# Patient Record
Sex: FEMALE | Employment: UNEMPLOYED | URBAN - METROPOLITAN AREA
[De-identification: names, ages, dates, MRNs, and addresses within clinical notes are randomized per-mention and may not be internally consistent; named-entity substitution may affect disease eponyms.]

---

## 2017-04-10 ENCOUNTER — TRANSFERRED RECORDS (OUTPATIENT)
Dept: HEALTH INFORMATION MANAGEMENT | Facility: CLINIC | Age: 37
End: 2017-04-10

## 2017-10-19 ENCOUNTER — TRANSFERRED RECORDS (OUTPATIENT)
Dept: HEALTH INFORMATION MANAGEMENT | Facility: CLINIC | Age: 37
End: 2017-10-19

## 2017-11-06 ENCOUNTER — TRANSFERRED RECORDS (OUTPATIENT)
Dept: HEALTH INFORMATION MANAGEMENT | Facility: CLINIC | Age: 37
End: 2017-11-06

## 2017-12-04 ASSESSMENT — ENCOUNTER SYMPTOMS
JAUNDICE: 0
NAUSEA: 0
HALLUCINATIONS: 0
ALTERED TEMPERATURE REGULATION: 1
INCREASED ENERGY: 0
PANIC: 0
INSOMNIA: 0
BLOOD IN STOOL: 1
HEARTBURN: 0
POLYPHAGIA: 0
POLYDIPSIA: 0
CONSTIPATION: 1
NERVOUS/ANXIOUS: 1
CHILLS: 0
DEPRESSION: 1
FATIGUE: 1
ABDOMINAL PAIN: 0
NIGHT SWEATS: 1
RECTAL PAIN: 0
BOWEL INCONTINENCE: 0
FEVER: 0
DIARRHEA: 0
DECREASED APPETITE: 0
BLOATING: 1
VOMITING: 0
HOT FLASHES: 1
WEIGHT LOSS: 0
DECREASED LIBIDO: 1
WEIGHT GAIN: 0
DECREASED CONCENTRATION: 1

## 2017-12-04 ASSESSMENT — ANXIETY QUESTIONNAIRES
4. TROUBLE RELAXING: SEVERAL DAYS
3. WORRYING TOO MUCH ABOUT DIFFERENT THINGS: MORE THAN HALF THE DAYS
GAD7 TOTAL SCORE: 12
7. FEELING AFRAID AS IF SOMETHING AWFUL MIGHT HAPPEN: MORE THAN HALF THE DAYS
6. BECOMING EASILY ANNOYED OR IRRITABLE: MORE THAN HALF THE DAYS
5. BEING SO RESTLESS THAT IT IS HARD TO SIT STILL: SEVERAL DAYS
7. FEELING AFRAID AS IF SOMETHING AWFUL MIGHT HAPPEN: MORE THAN HALF THE DAYS
GAD7 TOTAL SCORE: 12
2. NOT BEING ABLE TO STOP OR CONTROL WORRYING: MORE THAN HALF THE DAYS
GAD7 TOTAL SCORE: 12
1. FEELING NERVOUS, ANXIOUS, OR ON EDGE: MORE THAN HALF THE DAYS

## 2017-12-04 ASSESSMENT — PATIENT HEALTH QUESTIONNAIRE - PHQ9
SUM OF ALL RESPONSES TO PHQ QUESTIONS 1-9: 12
SUM OF ALL RESPONSES TO PHQ QUESTIONS 1-9: 12
10. IF YOU CHECKED OFF ANY PROBLEMS, HOW DIFFICULT HAVE THESE PROBLEMS MADE IT FOR YOU TO DO YOUR WORK, TAKE CARE OF THINGS AT HOME, OR GET ALONG WITH OTHER PEOPLE: SOMEWHAT DIFFICULT

## 2017-12-05 ENCOUNTER — OFFICE VISIT (OUTPATIENT)
Dept: OBGYN | Facility: CLINIC | Age: 37
End: 2017-12-05
Attending: OBSTETRICS & GYNECOLOGY
Payer: COMMERCIAL

## 2017-12-05 VITALS — SYSTOLIC BLOOD PRESSURE: 117 MMHG | DIASTOLIC BLOOD PRESSURE: 77 MMHG | WEIGHT: 158.6 LBS | HEART RATE: 56 BPM

## 2017-12-05 DIAGNOSIS — Z71.89 COUNSELING FOR HORMONE REPLACEMENT THERAPY: Primary | ICD-10-CM

## 2017-12-05 PROBLEM — C53.9 CERVICAL CANCER (H): Status: ACTIVE | Noted: 2017-12-05

## 2017-12-05 PROBLEM — F32.A DEPRESSION: Status: ACTIVE | Noted: 2017-12-05

## 2017-12-05 RX ORDER — ESCITALOPRAM OXALATE 10 MG/1
20 TABLET ORAL DAILY
COMMUNITY
Start: 2015-09-14

## 2017-12-05 RX ORDER — ESTRADIOL 0.05 MG/D
1 PATCH, EXTENDED RELEASE TRANSDERMAL
Qty: 24 PATCH | Refills: 0 | Status: SHIPPED | OUTPATIENT
Start: 2017-12-07 | End: 2018-02-27

## 2017-12-05 ASSESSMENT — ANXIETY QUESTIONNAIRES: GAD7 TOTAL SCORE: 12

## 2017-12-05 ASSESSMENT — PAIN SCALES - GENERAL: PAINLEVEL: NO PAIN (0)

## 2017-12-05 NOTE — PROGRESS NOTES
[facility]    HPI: Linette Rosa is a 37 year old  with a recent history of cervical cancer s/p chemoradiation who presents to discus hormone replacement therapy. 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 She has received her treatment with Dr. Weldon at [facility]. Since undergoing radiation, she has not had any menstrual bleeding; previously menses were regular. Additionally, she reports bothersome hot flashes and notes significant discomfort with intercourse and worsening of mood swings and depression symptoms. She was prescribed HRT by Dr. Weldon, however she has not started taking it yet as she is more interested in bioidentical options. She reports that she has heard from other people that bioidentical HRT works better. Aside from the cervical cancer, her only other past medical history is depression for which she is on Lexapro. She denies history of blood clots.     OB history:  x1    Past Medical History:   Diagnosis Date     Abnormal Pap smear 3/14/17    Diagnosed with Cervical Cancer on April 3, 2017     Herpes simplex without mention of complication 2007     Postpartum depression 2017     Past Surgical History:   Procedure Laterality Date     BIOPSY  3/30/2017    As a result of abnormal pap     COLONOSCOPY  04/10/2017    As a result of Pet SCAN post cancer diagnosis, normal result       Current Outpatient Prescriptions:      escitalopram (LEXAPRO) 10 MG tablet, 10 mg daily, Disp: , Rfl:      progesterone (PROMETRIUM) 200 MG capsule, Take 1 capsule (200 mg) by mouth daily, Disp: 90 capsule, Rfl: 3     [START ON 2017] estradiol (VIVELLE-DOT) 0.05 MG/24HR BIW patch, Place 1 patch onto the skin twice a week, Disp: 24 patch, Rfl: 0     cholecalciferol (VITAMIN  D-1000 MAX ST) 1000 UNITS TABS, Take 1,000 Units by mouth, Disp: , Rfl:      MAGNESIUM PO, Take 200 mg by mouth, Disp: , Rfl:        Allergies   Allergen Reactions     Carica Papaya Nausea and Vomiting     Levaquin Itching     Levofloxacin Itching     Salbador Flavor Nausea and Vomiting       Answers for HPI/ROS submitted by the patient on 12/4/2017   COLIN 7 TOTAL SCORE: 12  PHQ-2 Score: 4  If you checked off any problems, how difficult have these problems made it for you to do your work, take care of things at home, or get along with other people?: Somewhat difficult  PHQ9 TOTAL SCORE: 12  General Symptoms: Yes  Skin Symptoms: No  HENT Symptoms: No  EYE SYMPTOMS: No  HEART SYMPTOMS: No  LUNG SYMPTOMS: No  INTESTINAL SYMPTOMS: Yes  URINARY SYMPTOMS: No  GYNECOLOGIC SYMPTOMS: Yes  BREAST SYMPTOMS: No  SKELETAL SYMPTOMS: No  BLOOD SYMPTOMS: No  NERVOUS SYSTEM SYMPTOMS: No  MENTAL HEALTH SYMPTOMS: Yes  Fever: No  Loss of appetite: No  Weight loss: No  Weight gain: No  Fatigue: Yes  Night sweats: Yes  Chills: No  Increased stress: Yes  Excessive hunger: No  Excessive thirst: No  Feeling hot or cold when others believe the temperature is normal: Yes  Loss of height: No  Post-operative complications: No  Surgical site pain: Yes  Hallucinations: No  Change in or Loss of Energy: No  Hyperactivity: No  Confusion: No  Heart burn or indigestion: No  Nausea: No  Vomiting: No  Abdominal pain: No  Bloating: Yes  Constipation: Yes  Diarrhea: No  Blood in stool: Yes  Black stools: No  Rectal or Anal pain: No  Fecal incontinence: No  Yellowing of skin or eyes: No  Vomit with blood: No  Change in stools: No  Bleeding or spotting between periods: No  Heavy or painful periods: No  Irregular periods: No  Vaginal discharge: No  Hot flashes: Yes  Vaginal dryness: Yes  Reduced libido: Yes  Painful intercourse: Yes  Difficulty with sexual arousal: Yes  Post-menopausal bleeding: No  Nervous or Anxious: Yes  Depression: Yes  Trouble sleeping:  No  Trouble thinking or concentrating: Yes  Mood changes: Yes  Panic attacks: No    Exam:  Vitals:    12/05/17 1521   BP: 117/77   BP Location: Right arm   Patient Position: Chair   Pulse: 56   Weight: 71.9 kg (158 lb 9.6 oz)     Gen: alert, oriented, NAD  Not otherwise examined    A/P:  37 year old  with symptoms of 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 We discussed that often this term refers to compounded therapies, which are not FDA approved and which I do not prescribe. We reviewed the methods for administration of estradiol therapy including oral and transdermal routes. We discussed risks with HRT of blood clots, stroke and breast cancer- although this was among studies of postmenopausal women, whereas in Linette's case we are replacing to match normal physiology. Also, we reviewed the risks associated with NOT using HRT, which include increased risk of osteoporosis and cardiovascular disease.     We also discussed an alternate option to prometrium of a levonorgestrel IUD, with benefits of minimal systemic hormone effect and not needing to take a daily medication. We discussed that while levonorgestrel is not bioidentical, there is very minimal systemic circulation. Given that Linette is not actively undergoing treatment for cervical cancer, she would be a candidate for an IUD.     At the end of our discussion, she elected to start Vivelle dot and oral prometrium. She will return to clinic if she desires IUD placement. We discussed that dose of Vivelle can be titrated up to 0.1 mg/day if she tolerates well, which would be most physiologic for her age. We discussed that it may not resolve mood symptoms given this may be more  multifactorial. Plan follow up in clinic in 6-8 weeks, or if she is tolerating everything well we can communicate by phone in an effort to minimize visits for her.     A total of 30 minutes was spent face to face with Linette Rosa today. Over 50% of this time was spent counseling the patient and/or coordinating care regarding hormone replacement therapy.     Viky Rosa MD

## 2017-12-05 NOTE — LETTER
2017       RE: Linette Rosa  [address]  [place] [address]     Dear Colleague,    Thank you for referring your patient, Linette Rosa, to the [facility] at [facility]. Please see a copy of my visit note below.    [facility]    HPI: Linette Rosa is a 37 year old  with a recent history of cervical cancer s/p chemoradiation who presents to discus hormone replacement therapy. 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 She has received her treatment with Dr. Weldon at [facility]. Since undergoing radiation, she has not had any menstrual bleeding; previously menses were regular. Additionally, she reports bothersome hot flashes and notes significant discomfort with intercourse and worsening of mood swings and depression symptoms. She was prescribed HRT by Dr. Weldon, however she has not started taking it yet as she is more interested in bioidentical options. She reports that she has heard from other people that bioidentical HRT works better. Aside from the cervical cancer, her only other past medical history is depression for which she is on Lexapro. She denies history of blood clots.     OB history:  x1    Past Medical History:   Diagnosis Date     Abnormal Pap smear 3/14/17    Diagnosed with Cervical Cancer on April 3, 2017     Herpes simplex without mention of complication 2007     Postpartum depression 2017     Past Surgical History:   Procedure Laterality Date     BIOPSY  3/30/2017    As a result of abnormal pap     COLONOSCOPY  04/10/2017    As a result of Pet SCAN post cancer diagnosis, normal result       Current Outpatient Prescriptions:      escitalopram (LEXAPRO) 10 MG tablet, 10 mg daily, Disp:  , Rfl:      progesterone (PROMETRIUM) 200 MG capsule, Take 1 capsule (200 mg) by mouth daily, Disp: 90 capsule, Rfl: 3     [START ON 12/7/2017] estradiol (VIVELLE-DOT) 0.05 MG/24HR BIW patch, Place 1 patch onto the skin twice a week, Disp: 24 patch, Rfl: 0     cholecalciferol (VITAMIN D-1000 MAX ST) 1000 UNITS TABS, Take 1,000 Units by mouth, Disp: , Rfl:      MAGNESIUM PO, Take 200 mg by mouth, Disp: , Rfl:        Allergies   Allergen Reactions     Carica Papaya Nausea and Vomiting     Levaquin Itching     Levofloxacin Itching     Salbador Flavor Nausea and Vomiting       Answers for HPI/ROS submitted by the patient on 12/4/2017   COLIN 7 TOTAL SCORE: 12  PHQ-2 Score: 4  If you checked off any problems, how difficult have these problems made it for you to do your work, take care of things at home, or get along with other people?: Somewhat difficult  PHQ9 TOTAL SCORE: 12  General Symptoms: Yes  Skin Symptoms: No  HENT Symptoms: No  EYE SYMPTOMS: No  HEART SYMPTOMS: No  LUNG SYMPTOMS: No  INTESTINAL SYMPTOMS: Yes  URINARY SYMPTOMS: No  GYNECOLOGIC SYMPTOMS: Yes  BREAST SYMPTOMS: No  SKELETAL SYMPTOMS: No  BLOOD SYMPTOMS: No  NERVOUS SYSTEM SYMPTOMS: No  MENTAL HEALTH SYMPTOMS: Yes  Fever: No  Loss of appetite: No  Weight loss: No  Weight gain: No  Fatigue: Yes  Night sweats: Yes  Chills: No  Increased stress: Yes  Excessive hunger: No  Excessive thirst: No  Feeling hot or cold when others believe the temperature is normal: Yes  Loss of height: No  Post-operative complications: No  Surgical site pain: Yes  Hallucinations: No  Change in or Loss of Energy: No  Hyperactivity: No  Confusion: No  Heart burn or indigestion: No  Nausea: No  Vomiting: No  Abdominal pain: No  Bloating: Yes  Constipation: Yes  Diarrhea: No  Blood in stool: Yes  Black stools: No  Rectal or Anal pain: No  Fecal incontinence: No  Yellowing of skin or eyes: No  Vomit with blood: No  Change in stools: No  Bleeding or spotting between periods: No  Heavy  or painful periods: No  Irregular periods: No  Vaginal discharge: No  Hot flashes: Yes  Vaginal dryness: Yes  Reduced libido: Yes  Painful intercourse: Yes  Difficulty with sexual arousal: Yes  Post-menopausal bleeding: No  Nervous or Anxious: Yes  Depression: Yes  Trouble sleeping: No  Trouble thinking or concentrating: Yes  Mood changes: Yes  Panic attacks: No    Exam:  Vitals:    12/05/17 1521   BP: 117/77   BP Location: Right arm   Patient Position: Chair   Pulse: 56   Weight: 71.9 kg (158 lb 9.6 oz)     Gen: alert, oriented, NAD  Not otherwise examined    A/P:  37 year old  with symptoms of 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 We discussed that often this term refers to compounded therapies, which are not FDA approved and which I do not prescribe. We reviewed the methods for administration of estradiol therapy including oral and transdermal routes. We discussed risks with HRT of blood clots, stroke and breast cancer- although this was among studies of postmenopausal women, whereas in Linette's case we are replacing to match normal physiology. Also, we reviewed the risks associated with NOT using HRT, which include increased risk of osteoporosis and cardiovascular disease.     We also discussed an alternate option to prometrium of a levonorgestrel IUD, with benefits of minimal systemic hormone effect and not needing to take a daily medication. We discussed that while levonorgestrel is not bioidentical, there is very minimal systemic circulation. Given that Linette is not actively undergoing treatment for cervical cancer, she would be a candidate for an IUD.     At the end of our discussion, she elected to start Tara hyatt and  oral prometrium. She will return to clinic if she desires IUD placement. We discussed that dose of Vivelle can be titrated up to 0.1 mg/day if she tolerates well, which would be most physiologic for her age. We discussed that it may not resolve mood symptoms given this may be more multifactorial. Plan follow up in clinic in 6-8 weeks, or if she is tolerating everything well we can communicate by phone in an effort to minimize visits for her.     A total of 30 minutes was spent face to face with Linetteluna Rosa today. Over 50% of this time was spent counseling the patient and/or coordinating care regarding hormone replacement therapy.     Viky Rosa MD      Again, thank you for allowing me to participate in the care of your patient.      Sincerely,    Viky Rosa MD

## 2017-12-05 NOTE — LETTER
Date:December 6, 2017      Patient was self referred, no letter generated. Do not send.        [facility] Health Information

## 2017-12-05 NOTE — MR AVS SNAPSHOT
After Visit Summary   12/5/2017    Linette Rosa    MRN: [id number]           Patient Information     Date Of Birth          1980        Visit Information        Provider Department      12/5/2017 3:30 PM Viky Rosa MD [facility]        Today's Diagnoses     Counseling for hormone replacement therapy    -  1       Follow-ups after your visit        Follow-up notes from your care team     Return in about 6 weeks (around 1/16/2018).      Who to contact     Please call your clinic at [phone] to:    Ask questions about your health    Make or cancel appointments    Discuss your medicines    Learn about your test results    Speak to your doctor   If you have compliments or concerns about an experience at your clinic, or if you wish to file a complaint, please contact [facility] Patient Relations at [phone] or email us at [email or web address]         Additional Information About Your Visit        MyChart Information     [employer] gives you secure access to your electronic health record. If you see a primary care provider, you can also send messages to your care team and make appointments. If you have questions, please call your primary care clinic.  If you do not have a primary care provider, please call [phone] and they will assist you.      test company is an electronic gateway that provides easy, online access to your medical records. With test company, you can request a clinic appointment, read your test results, renew a prescription or communicate with your care team.     To access your existing account, please contact your [facility] or call [phone] for assistance.        Care EveryWhere ID     This is your Care EveryWhere ID. This could be used by other organizations to access your [facility] medical records  [id number]        Your Vitals Were     Pulse Breastfeeding?                56  No           Blood Pressure from Last 3 Encounters:   12/05/17 117/77    Weight from Last 3 Encounters:   12/05/17 71.9 kg (158 lb 9.6 oz)              Today, you had the following     No orders found for display         Today's Medication Changes          These changes are accurate as of: 12/5/17  5:15 PM.  If you have any questions, ask your nurse or doctor.               Start taking these medicines.        Dose/Directions    estradiol 0.05 MG/24HR BIW patch   Commonly known as:  VIVELLE-DOT   Used for:  Counseling for hormone replacement therapy   Started by:  Viky Rosa MD        Dose:  1 patch   Start taking on:  12/7/2017   Place 1 patch onto the skin twice a week   Quantity:  24 patch   Refills:  0       progesterone 200 MG capsule   Commonly known as:  PROMETRIUM   Used for:  Counseling for hormone replacement therapy   Started by:  Viky Rosa MD        Dose:  200 mg   Take 1 capsule (200 mg) by mouth daily   Quantity:  90 capsule   Refills:  3            Where to get your medicines      These medications were sent to [id number] - [place], [place] - [address] AT [facility] [address]  [address], [place] [address]    Hours:  24-hours Phone:  [phone]     estradiol 0.05 MG/24HR BIW patch    progesterone 200 MG capsule                Primary Care Provider    None Specified       No primary provider on file.        Equal Access to Services     [facility] AH: Hadii flakita garcia hadasho Sodennys, waaxda luqadaha, qaybta kaalmada adeegyada, torrie lindsey . So [facility] [phone].    ATENCIÓN: Si habla español, tiene a lemon disposición servicios gratuitos de asistencia lingüística. Llame al [phone].    We comply with applicable federal civil rights laws and [place] laws. We do not discriminate on the basis of race, color, national origin, age, disability, sex, sexual orientation, or gender identity.            Thank you!      Thank you for choosing [facility]  for your care. Our goal is always to provide you with excellent care. Hearing back from our patients is one way we can continue to improve our services. Please take a few minutes to complete the written survey that you may receive in the mail after your visit with us. Thank you!             Your Updated Medication List - Protect others around you: Learn how to safely use, store and throw away your medicines at www.disposemymeds.org.          This list is accurate as of: 12/5/17  5:15 PM.  Always use your most recent med list.                   Brand Name Dispense Instructions for use Diagnosis    escitalopram 10 MG tablet    LEXAPRO     10 mg daily        estradiol 0.05 MG/24HR BIW patch   Start taking on:  12/7/2017    VIVELLE-DOT    24 patch    Place 1 patch onto the skin twice a week    Counseling for hormone replacement therapy       MAGNESIUM PO      Take 200 mg by mouth        progesterone 200 MG capsule    PROMETRIUM    90 capsule    Take 1 capsule (200 mg) by mouth daily    Counseling for hormone replacement therapy       VITAMIN D-1000 MAX ST 1000 UNITS Tabs   Generic drug:  cholecalciferol      Take 1,000 Units by mouth

## 2017-12-05 NOTE — LETTER
2017       RE: Linette Rosa  [address]  [place] [address]     Dear Colleague,    Thank you for referring your patient, Linette Rosa, to the [facility] at [facility]. Please see a copy of my visit note below.    [facility]    HPI: Linette Rosa is a 37 year old  with a recent history of cervical cancer s/p chemoradiation who presents to discus hormone replacement therapy. 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 She has received her treatment with Dr. Weldon at [facility]. Since undergoing radiation, she has not had any menstrual bleeding; previously menses were regular. Additionally, she reports bothersome hot flashes and notes significant discomfort with intercourse and worsening of mood swings and depression symptoms. She was prescribed HRT by Dr. Weldon, however she has not started taking it yet as she is more interested in bioidentical options. She reports that she has heard from other people that bioidentical HRT works better. Aside from the cervical cancer, her only other past medical history is depression for which she is on Lexapro. She denies history of blood clots.     OB history:  x1    Past Medical History:   Diagnosis Date     Abnormal Pap smear 3/14/17    Diagnosed with Cervical Cancer on April 3, 2017     Herpes simplex without mention of complication 2007     Postpartum depression 2017     Past Surgical History:   Procedure Laterality Date     BIOPSY  3/30/2017    As a result of abnormal pap     COLONOSCOPY  04/10/2017    As a result of Pet SCAN post cancer diagnosis, normal result       Current Outpatient Prescriptions:      escitalopram (LEXAPRO) 10 MG tablet, 10 mg daily, Disp:  , Rfl:      progesterone (PROMETRIUM) 200 MG capsule, Take 1 capsule (200 mg) by mouth daily, Disp: 90 capsule, Rfl: 3     [START ON 12/7/2017] estradiol (VIVELLE-DOT) 0.05 MG/24HR BIW patch, Place 1 patch onto the skin twice a week, Disp: 24 patch, Rfl: 0     cholecalciferol (VITAMIN D-1000 MAX ST) 1000 UNITS TABS, Take 1,000 Units by mouth, Disp: , Rfl:      MAGNESIUM PO, Take 200 mg by mouth, Disp: , Rfl:        Allergies   Allergen Reactions     Carica Papaya Nausea and Vomiting     Levaquin Itching     Levofloxacin Itching     Salbador Flavor Nausea and Vomiting       Answers for HPI/ROS submitted by the patient on 12/4/2017   COLIN 7 TOTAL SCORE: 12  PHQ-2 Score: 4  If you checked off any problems, how difficult have these problems made it for you to do your work, take care of things at home, or get along with other people?: Somewhat difficult  PHQ9 TOTAL SCORE: 12  General Symptoms: Yes  Skin Symptoms: No  HENT Symptoms: No  EYE SYMPTOMS: No  HEART SYMPTOMS: No  LUNG SYMPTOMS: No  INTESTINAL SYMPTOMS: Yes  URINARY SYMPTOMS: No  GYNECOLOGIC SYMPTOMS: Yes  BREAST SYMPTOMS: No  SKELETAL SYMPTOMS: No  BLOOD SYMPTOMS: No  NERVOUS SYSTEM SYMPTOMS: No  MENTAL HEALTH SYMPTOMS: Yes  Fever: No  Loss of appetite: No  Weight loss: No  Weight gain: No  Fatigue: Yes  Night sweats: Yes  Chills: No  Increased stress: Yes  Excessive hunger: No  Excessive thirst: No  Feeling hot or cold when others believe the temperature is normal: Yes  Loss of height: No  Post-operative complications: No  Surgical site pain: Yes  Hallucinations: No  Change in or Loss of Energy: No  Hyperactivity: No  Confusion: No  Heart burn or indigestion: No  Nausea: No  Vomiting: No  Abdominal pain: No  Bloating: Yes  Constipation: Yes  Diarrhea: No  Blood in stool: Yes  Black stools: No  Rectal or Anal pain: No  Fecal incontinence: No  Yellowing of skin or eyes: No  Vomit with blood: No  Change in stools: No  Bleeding or spotting between periods: No  Heavy  or painful periods: No  Irregular periods: No  Vaginal discharge: No  Hot flashes: Yes  Vaginal dryness: Yes  Reduced libido: Yes  Painful intercourse: Yes  Difficulty with sexual arousal: Yes  Post-menopausal bleeding: No  Nervous or Anxious: Yes  Depression: Yes  Trouble sleeping: No  Trouble thinking or concentrating: Yes  Mood changes: Yes  Panic attacks: No    Exam:  Vitals:    12/05/17 1521   BP: 117/77   BP Location: Right arm   Patient Position: Chair   Pulse: 56   Weight: 71.9 kg (158 lb 9.6 oz)     Gen: alert, oriented, NAD  Not otherwise examined    A/P:  37 year old  with symptoms of 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 We discussed that often this term refers to compounded therapies, which are not FDA approved and which I do not prescribe. We reviewed the methods for administration of estradiol therapy including oral and transdermal routes. We discussed risks with HRT of blood clots, stroke and breast cancer- although this was among studies of postmenopausal women, whereas in Linette's case we are replacing to match normal physiology. Also, we reviewed the risks associated with NOT using HRT, which include increased risk of osteoporosis and cardiovascular disease.     We also discussed an alternate option to prometrium of a levonorgestrel IUD, with benefits of minimal systemic hormone effect and not needing to take a daily medication. We discussed that while levonorgestrel is not bioidentical, there is very minimal systemic circulation. Given that Linette is not actively undergoing treatment for cervical cancer, she would be a candidate for an IUD.     At the end of our discussion, she elected to start Tara hyatt and  oral prometrium. She will return to clinic if she desires IUD placement. We discussed that dose of Vivelle can be titrated up to 0.1 mg/day if she tolerates well, which would be most physiologic for her age. We discussed that it may not resolve mood symptoms given this may be more multifactorial. Plan follow up in clinic in 6-8 weeks, or if she is tolerating everything well we can communicate by phone in an effort to minimize visits for her.     A total of 30 minutes was spent face to face with Linetteluna Rosa today. Over 50% of this time was spent counseling the patient and/or coordinating care regarding hormone replacement therapy.     Viky Rosa MD      Again, thank you for allowing me to participate in the care of your patient.      Sincerely,    Viky Rosa MD

## 2018-01-28 ENCOUNTER — HEALTH MAINTENANCE LETTER (OUTPATIENT)
Age: 38
End: 2018-01-28

## 2018-01-29 ENCOUNTER — TRANSFERRED RECORDS (OUTPATIENT)
Dept: HEALTH INFORMATION MANAGEMENT | Facility: CLINIC | Age: 38
End: 2018-01-29

## 2018-02-09 ENCOUNTER — TRANSFERRED RECORDS (OUTPATIENT)
Dept: HEALTH INFORMATION MANAGEMENT | Facility: CLINIC | Age: 38
End: 2018-02-09

## 2018-02-27 DIAGNOSIS — Z71.89 COUNSELING FOR HORMONE REPLACEMENT THERAPY: ICD-10-CM

## 2018-02-27 RX ORDER — ESTRADIOL 0.05 MG/D
1 PATCH, EXTENDED RELEASE TRANSDERMAL
Qty: 24 PATCH | Refills: 2 | Status: SHIPPED | OUTPATIENT
Start: 2018-03-01 | End: 2018-04-04

## 2018-02-27 NOTE — TELEPHONE ENCOUNTER
Received refill request for Tara hyatt.  Last in clinic 12/2017 at which time HRT medications were started.. Dr Rosa did send [employer] message 2/2018 to see how Linette was tolerating medications and if needed a change in dosage but Linette didn't respond.     I attempted to reach Linette,left VM I would send refill to Dr Rosa for approval and if she needed to discuss any dosage changes to either [employer] [facility] or call nurse triage.

## 2018-03-02 ENCOUNTER — TRANSFERRED RECORDS (OUTPATIENT)
Dept: HEALTH INFORMATION MANAGEMENT | Facility: CLINIC | Age: 38
End: 2018-03-02

## 2018-03-28 ENCOUNTER — TRANSFERRED RECORDS (OUTPATIENT)
Dept: HEALTH INFORMATION MANAGEMENT | Facility: CLINIC | Age: 38
End: 2018-03-28

## 2018-04-02 ENCOUNTER — TRANSFERRED RECORDS (OUTPATIENT)
Dept: HEALTH INFORMATION MANAGEMENT | Facility: CLINIC | Age: 38
End: 2018-04-02

## 2018-04-04 DIAGNOSIS — Z71.89 COUNSELING FOR HORMONE REPLACEMENT THERAPY: ICD-10-CM

## 2018-04-04 RX ORDER — ESTRADIOL 0.05 MG/D
1 PATCH, EXTENDED RELEASE TRANSDERMAL
Qty: 24 PATCH | Refills: 2 | Status: SHIPPED | OUTPATIENT
Start: 2018-04-05

## 2018-04-04 NOTE — TELEPHONE ENCOUNTER
Received request for estradiol patch and progesterone to be sent to [facility].    Remaining prescriptions sent.

## 2018-04-09 ENCOUNTER — TELEPHONE (OUTPATIENT)
Dept: OBGYN | Facility: CLINIC | Age: 38
End: 2018-04-09

## 2018-04-09 NOTE — TELEPHONE ENCOUNTER
[facility]ed [facility] to [facility] -- seeking clarification of order. Provided directions per Dr. Rosa's order on 4/4

## 2018-04-09 NOTE — TELEPHONE ENCOUNTER
----- Message from Martina Frazier sent at 4/6/2018  2:09 PM CDT -----  Regarding: [facility]/Estrodial Patch  [facility] calling about estrodial patch.  Reference [id number]  Call:  [phone]

## 2018-04-25 ENCOUNTER — TRANSFERRED RECORDS (OUTPATIENT)
Dept: HEALTH INFORMATION MANAGEMENT | Facility: CLINIC | Age: 38
End: 2018-04-25

## 2018-08-06 ENCOUNTER — TRANSFERRED RECORDS (OUTPATIENT)
Dept: HEALTH INFORMATION MANAGEMENT | Facility: CLINIC | Age: 38
End: 2018-08-06

## 2018-08-10 ENCOUNTER — TRANSFERRED RECORDS (OUTPATIENT)
Dept: HEALTH INFORMATION MANAGEMENT | Facility: CLINIC | Age: 38
End: 2018-08-10

## 2018-08-16 ENCOUNTER — TRANSFERRED RECORDS (OUTPATIENT)
Dept: HEALTH INFORMATION MANAGEMENT | Facility: CLINIC | Age: 38
End: 2018-08-16

## 2018-09-18 ENCOUNTER — TRANSFERRED RECORDS (OUTPATIENT)
Dept: HEALTH INFORMATION MANAGEMENT | Facility: CLINIC | Age: 38
End: 2018-09-18

## 2018-10-16 ENCOUNTER — TRANSFERRED RECORDS (OUTPATIENT)
Dept: HEALTH INFORMATION MANAGEMENT | Facility: CLINIC | Age: 38
End: 2018-10-16

## 2018-10-31 ENCOUNTER — TRANSFERRED RECORDS (OUTPATIENT)
Dept: HEALTH INFORMATION MANAGEMENT | Facility: CLINIC | Age: 38
End: 2018-10-31

## 2018-11-28 ENCOUNTER — TRANSFERRED RECORDS (OUTPATIENT)
Dept: HEALTH INFORMATION MANAGEMENT | Facility: CLINIC | Age: 38
End: 2018-11-28

## 2018-12-07 ENCOUNTER — TRANSFERRED RECORDS (OUTPATIENT)
Dept: HEALTH INFORMATION MANAGEMENT | Facility: CLINIC | Age: 38
End: 2018-12-07

## 2019-01-01 ENCOUNTER — DOCUMENTATION ONLY (OUTPATIENT)
Dept: GASTROENTEROLOGY | Facility: CLINIC | Age: 39
End: 2019-01-01

## 2019-01-01 ENCOUNTER — HOME INFUSION (PRE-WILLOW HOME INFUSION) (OUTPATIENT)
Dept: PHARMACY | Facility: CLINIC | Age: 39
End: 2019-01-01

## 2019-01-01 ENCOUNTER — OFFICE VISIT (OUTPATIENT)
Dept: GASTROENTEROLOGY | Facility: CLINIC | Age: 39
End: 2019-01-01
Payer: COMMERCIAL

## 2019-01-01 ENCOUNTER — TELEPHONE (OUTPATIENT)
Dept: GASTROENTEROLOGY | Facility: CLINIC | Age: 39
End: 2019-01-01

## 2019-01-01 ENCOUNTER — PRE VISIT (OUTPATIENT)
Dept: GASTROENTEROLOGY | Facility: CLINIC | Age: 39
End: 2019-01-01

## 2019-01-01 ENCOUNTER — PATIENT OUTREACH (OUTPATIENT)
Dept: GASTROENTEROLOGY | Facility: CLINIC | Age: 39
End: 2019-01-01

## 2019-01-01 ENCOUNTER — DOCUMENTATION ONLY (OUTPATIENT)
Dept: CARE COORDINATION | Facility: CLINIC | Age: 39
End: 2019-01-01

## 2019-01-01 ENCOUNTER — TRANSFERRED RECORDS (OUTPATIENT)
Dept: HEALTH INFORMATION MANAGEMENT | Facility: CLINIC | Age: 39
End: 2019-01-01

## 2019-01-01 VITALS
HEIGHT: 66 IN | OXYGEN SATURATION: 100 % | WEIGHT: 131.9 LBS | SYSTOLIC BLOOD PRESSURE: 101 MMHG | BODY MASS INDEX: 21.2 KG/M2 | DIASTOLIC BLOOD PRESSURE: 69 MMHG | HEART RATE: 66 BPM

## 2019-01-01 VITALS
BODY MASS INDEX: 21.71 KG/M2 | HEART RATE: 75 BPM | TEMPERATURE: 98.1 F | HEIGHT: 66 IN | SYSTOLIC BLOOD PRESSURE: 97 MMHG | OXYGEN SATURATION: 100 % | WEIGHT: 135.1 LBS | DIASTOLIC BLOOD PRESSURE: 53 MMHG

## 2019-01-01 DIAGNOSIS — K51.011 ULCERATIVE PANCOLITIS WITH RECTAL BLEEDING (H): Primary | ICD-10-CM

## 2019-01-01 DIAGNOSIS — K51.011 ULCERATIVE PANCOLITIS WITH RECTAL BLEEDING (H): ICD-10-CM

## 2019-01-01 LAB
ALBUMIN SERPL-MCNC: 3.2 G/DL (ref 3.4–5)
ALP SERPL-CCNC: 65 U/L (ref 40–150)
ALT SERPL W P-5'-P-CCNC: 14 U/L (ref 0–50)
ANION GAP SERPL CALCULATED.3IONS-SCNC: 5 MMOL/L (ref 3–14)
AST SERPL W P-5'-P-CCNC: 15 U/L (ref 0–45)
BASOPHILS # BLD AUTO: 0 10E9/L (ref 0–0.2)
BASOPHILS NFR BLD AUTO: 0.6 %
BILIRUB DIRECT SERPL-MCNC: <0.1 MG/DL (ref 0–0.2)
BILIRUB SERPL-MCNC: 0.2 MG/DL (ref 0.2–1.3)
BUN SERPL-MCNC: 14 MG/DL (ref 7–30)
CALCIUM SERPL-MCNC: 9.3 MG/DL (ref 8.5–10.1)
CHLORIDE SERPL-SCNC: 106 MMOL/L (ref 94–109)
CO2 SERPL-SCNC: 28 MMOL/L (ref 20–32)
CREAT SERPL-MCNC: 0.8 MG/DL (ref 0.52–1.04)
CRP SERPL-MCNC: 16.7 MG/L (ref 0–8)
DIFFERENTIAL METHOD BLD: ABNORMAL
EOSINOPHIL # BLD AUTO: 0 10E9/L (ref 0–0.7)
EOSINOPHIL NFR BLD AUTO: 0 %
ERYTHROCYTE [DISTWIDTH] IN BLOOD BY AUTOMATED COUNT: 12.7 % (ref 10–15)
ERYTHROCYTE [SEDIMENTATION RATE] IN BLOOD BY WESTERGREN METHOD: 68 MM/H (ref 0–20)
GFR SERPL CREATININE-BSD FRML MDRD: >90 ML/MIN/{1.73_M2}
GLUCOSE SERPL-MCNC: 91 MG/DL (ref 70–99)
HBV CORE AB SERPL QL IA: NONREACTIVE
HBV SURFACE AB SERPL IA-ACNC: 3.77 M[IU]/ML
HBV SURFACE AG SERPL QL IA: NONREACTIVE
HCT VFR BLD AUTO: 33.3 % (ref 35–47)
HCV AB SERPL QL IA: NONREACTIVE
HGB BLD-MCNC: 10.4 G/DL (ref 11.7–15.7)
IMM GRANULOCYTES # BLD: 0 10E9/L (ref 0–0.4)
IMM GRANULOCYTES NFR BLD: 0 %
LYMPHOCYTES # BLD AUTO: 0.8 10E9/L (ref 0.8–5.3)
LYMPHOCYTES NFR BLD AUTO: 25 %
MCH RBC QN AUTO: 29.5 PG (ref 26.5–33)
MCHC RBC AUTO-ENTMCNC: 31.2 G/DL (ref 31.5–36.5)
MCV RBC AUTO: 95 FL (ref 78–100)
MONOCYTES # BLD AUTO: 0.4 10E9/L (ref 0–1.3)
MONOCYTES NFR BLD AUTO: 11.7 %
NEUTROPHILS # BLD AUTO: 2 10E9/L (ref 1.6–8.3)
NEUTROPHILS NFR BLD AUTO: 62.7 %
NRBC # BLD AUTO: 0 10*3/UL
NRBC BLD AUTO-RTO: 0 /100
PLATELET # BLD AUTO: 189 10E9/L (ref 150–450)
POTASSIUM SERPL-SCNC: 4.1 MMOL/L (ref 3.4–5.3)
PROT SERPL-MCNC: 7 G/DL (ref 6.8–8.8)
RBC # BLD AUTO: 3.52 10E12/L (ref 3.8–5.2)
SODIUM SERPL-SCNC: 138 MMOL/L (ref 133–144)
TPMT BLD-CCNC: 21.7 U/ML (ref 24–44)
WBC # BLD AUTO: 3.2 10E9/L (ref 4–11)

## 2019-01-01 RX ORDER — GABAPENTIN 100 MG/1
CAPSULE ORAL
Refills: 3 | COMMUNITY
Start: 2019-01-01

## 2019-01-01 RX ORDER — BUDESONIDE 9 MG/1
9 TABLET, FILM COATED, EXTENDED RELEASE ORAL EVERY MORNING
Qty: 30 TABLET | Refills: 1 | Status: SHIPPED | OUTPATIENT
Start: 2019-01-01

## 2019-01-01 RX ORDER — HYDROCORTISONE 100 MG/60ML
100 SUSPENSION RECTAL AT BEDTIME
Qty: 30 ENEMA | Refills: 3 | Status: SHIPPED | OUTPATIENT
Start: 2019-01-01

## 2019-01-01 RX ORDER — MULTIVIT-MIN/IRON/FOLIC ACID/K 18-600-40
500 CAPSULE ORAL DAILY
COMMUNITY

## 2019-01-01 RX ORDER — BUDESONIDE 9 MG/1
9 TABLET, FILM COATED, EXTENDED RELEASE ORAL EVERY MORNING
Qty: 30 TABLET | Refills: 1 | Status: SHIPPED | OUTPATIENT
Start: 2019-01-01 | End: 2019-01-01

## 2019-01-01 RX ORDER — NALTREXONE HYDROCHLORIDE 50 MG/1
TABLET, FILM COATED ORAL
COMMUNITY
Start: 2019-08-01

## 2019-01-01 RX ORDER — BUDESONIDE 3 MG/1
CAPSULE, COATED PELLETS ORAL
Refills: 3 | COMMUNITY
Start: 2019-01-01 | End: 2019-01-01

## 2019-01-01 RX ORDER — LORAZEPAM 1 MG/1
TABLET ORAL
Refills: 0 | COMMUNITY
Start: 2019-05-26

## 2019-01-01 RX ORDER — HYDROMORPHONE HYDROCHLORIDE 2 MG/1
TABLET ORAL
Refills: 0 | COMMUNITY
Start: 2019-01-01

## 2019-01-01 RX ORDER — HYDROCODONE BITARTRATE AND ACETAMINOPHEN 5; 325 MG/1; MG/1
TABLET ORAL
Refills: 0 | COMMUNITY
Start: 2019-08-07

## 2019-01-01 RX ORDER — PROCHLORPERAZINE MALEATE 10 MG
10 TABLET ORAL
COMMUNITY
Start: 2018-08-27

## 2019-01-01 RX ORDER — MESALAMINE 800 MG/1
2400 TABLET, DELAYED RELEASE ORAL DAILY
Qty: 90 TABLET | Refills: 0 | Status: SHIPPED | OUTPATIENT
Start: 2019-01-01

## 2019-01-01 ASSESSMENT — ENCOUNTER SYMPTOMS
WEIGHT LOSS: 0
CHILLS: 1
FATIGUE: 1
DECREASED CONCENTRATION: 1
STIFFNESS: 0
POLYPHAGIA: 0
HEARTBURN: 1
DYSURIA: 0
INCREASED ENERGY: 1
INSOMNIA: 1
INCREASED ENERGY: 1
BLOATING: 1
DIFFICULTY URINATING: 0
FATIGUE: 1
MYALGIAS: 1
INSOMNIA: 1
HEMATURIA: 1
ALTERED TEMPERATURE REGULATION: 1
WEIGHT GAIN: 0
MUSCLE CRAMPS: 1
SWOLLEN GLANDS: 0
NECK PAIN: 1
BLOOD IN STOOL: 1
ABDOMINAL PAIN: 1
NIGHT SWEATS: 1
NERVOUS/ANXIOUS: 1
DECREASED APPETITE: 0
WEIGHT GAIN: 0
FEVER: 0
BOWEL INCONTINENCE: 1
PANIC: 0
NIGHT SWEATS: 1
WEIGHT LOSS: 0
JOINT SWELLING: 1
PANIC: 0
POLYPHAGIA: 0
CHILLS: 1
BACK PAIN: 1
JAUNDICE: 0
HALLUCINATIONS: 0
POLYDIPSIA: 0
POLYDIPSIA: 0
DEPRESSION: 1
ALTERED TEMPERATURE REGULATION: 1
ARTHRALGIAS: 0
VOMITING: 1
NERVOUS/ANXIOUS: 1
FEVER: 0
DECREASED CONCENTRATION: 1
CONSTIPATION: 1
DIARRHEA: 1
NAUSEA: 1
DEPRESSION: 1
FLANK PAIN: 0
MUSCLE WEAKNESS: 0
DECREASED APPETITE: 1
HALLUCINATIONS: 0
BRUISES/BLEEDS EASILY: 0

## 2019-01-01 ASSESSMENT — MIFFLIN-ST. JEOR
SCORE: 1290.04
SCORE: 1304.56

## 2019-01-01 ASSESSMENT — PAIN SCALES - GENERAL
PAINLEVEL: NO PAIN (0)
PAINLEVEL: NO PAIN (0)

## 2019-01-18 ENCOUNTER — TELEPHONE (OUTPATIENT)
Dept: OBGYN | Facility: CLINIC | Age: 39
End: 2019-01-18

## 2019-01-18 DIAGNOSIS — Z71.89 COUNSELING FOR HORMONE REPLACEMENT THERAPY: ICD-10-CM

## 2019-01-18 NOTE — TELEPHONE ENCOUNTER
Patient returned call from RN. States she will be making an appointment with clinic very soon.    Requests that supply of progesterone be sent to [facility] (originally sent to [facility]).    Reordered Rx to [facility].

## 2019-01-18 NOTE — TELEPHONE ENCOUNTER
Received refill request for Progesterone from [facility].  Last in clinic December 2017.      Called to patient and left message. She is due to be seen in clinic and will send short-term supply of progesterone to [facility].

## 2019-03-19 ENCOUNTER — TRANSFERRED RECORDS (OUTPATIENT)
Dept: HEALTH INFORMATION MANAGEMENT | Facility: CLINIC | Age: 39
End: 2019-03-19

## 2019-03-27 ENCOUNTER — TRANSFERRED RECORDS (OUTPATIENT)
Dept: HEALTH INFORMATION MANAGEMENT | Facility: CLINIC | Age: 39
End: 2019-03-27

## 2019-03-29 ENCOUNTER — TRANSFERRED RECORDS (OUTPATIENT)
Dept: HEALTH INFORMATION MANAGEMENT | Facility: CLINIC | Age: 39
End: 2019-03-29

## 2019-04-08 ENCOUNTER — TRANSFERRED RECORDS (OUTPATIENT)
Dept: HEALTH INFORMATION MANAGEMENT | Facility: CLINIC | Age: 39
End: 2019-04-08

## 2019-04-10 ENCOUNTER — TRANSFERRED RECORDS (OUTPATIENT)
Dept: HEALTH INFORMATION MANAGEMENT | Facility: CLINIC | Age: 39
End: 2019-04-10

## 2019-04-30 ENCOUNTER — TRANSFERRED RECORDS (OUTPATIENT)
Dept: HEALTH INFORMATION MANAGEMENT | Facility: CLINIC | Age: 39
End: 2019-04-30

## 2019-05-24 ENCOUNTER — TRANSFERRED RECORDS (OUTPATIENT)
Dept: HEALTH INFORMATION MANAGEMENT | Facility: CLINIC | Age: 39
End: 2019-05-24

## 2019-07-03 ENCOUNTER — TRANSFERRED RECORDS (OUTPATIENT)
Dept: HEALTH INFORMATION MANAGEMENT | Facility: CLINIC | Age: 39
End: 2019-07-03

## 2019-07-08 ENCOUNTER — TRANSFERRED RECORDS (OUTPATIENT)
Dept: HEALTH INFORMATION MANAGEMENT | Facility: CLINIC | Age: 39
End: 2019-07-08

## 2019-10-04 NOTE — PROGRESS NOTES
Referring Provider and Clinic:  Dr. Malorie Pacheco @ [facility]    Diagnosis:  Diarrhea    GI notes or primary provider notes related to GI problem:        Pathology Reports: N    Recent Lab Reports: Y    Radiology Reports (CT/MRI) : N    Endoscopy:  N    Colonoscopy: Y-4.30.19        Referral Date: 9.23.19    Date complete records received and sent for review: 10.4.19    Date records scanned into epic:     Provider Review Date:     Date review routed back to sender:     Letter sent:     Notes:

## 2019-10-11 NOTE — PROGRESS NOTES
REFERRAL REVIEW FORM    Is this a second opinion from another GI physician?  (If yes, OK to refer to for an MD only clinic visit)  No    Reason for referral: Colitis    Date records reviewed: October 11, 2019     Automatic yes:     IBD    Previous work up:    Labs  Colonoscopy  GI evaluation    Summary of pertinent GI work-up:    Recommendation:    Schedule clinic appointment with general GI MD only (NOT BENOIT or fellow) - Choose this if patient has been seen by another GI provider for this problem    When to schedule:  Within 1-2 weeks    Comments:   New IBD

## 2019-10-14 NOTE — PROGRESS NOTES
Pt now scheduled on October 23 with Ms. Poon at 220 pm. Pt instructed on time to check in , place and parking options.  Pt has been seen by [facility] and [facility].

## 2019-10-16 NOTE — TELEPHONE ENCOUNTER
RECORDS RECEIVED FROM: [facility] (IN [facility])    DATE RECEIVED: 10/24/19    NOTES STATUS DETAILS   OFFICE NOTE from referring provider Care Everywhere [facility] recs in [facility]    OFFICE NOTE from other specialist Received [facility] recs scanned in epic  Fax to [facility] to obtain recs  - 10/16  Per [facility] need KRAIME from pt - 10/22     DISCHARGE SUMMARY from hospital N/A    OPERATIVE REPORT N/A    MEDICATION LIST N/A         ENDOSCOPY  N/A    COLONOSCOPY Received 4/10/17   ERCP N/A    EUS N/A    STOOL TESTING N/A    PERTINENT LABS Received [facility] labs scanned in epic 4/8/19    PATHOLOGY REPORTS (RELATED) N/A    IMAGING (CT, MRI, EGD) In process Fax to [facility] to push related images - 10/16      REFERRAL INFORMATION    Date referral was placed: 10/24/19   Date all records received: N/A   Date records were scanned into Epic: N/A   Date records were sent to Provider to review: N/A   Date and recommendation received from provider:  LETTER SENT  SCHEDULE APPOINTMENT   Date patient was contacted to schedule: 10/14/19

## 2019-10-16 NOTE — PROGRESS NOTES
Contacted pt to let her know there was a scheduling error and pt moved to the 23 at 220.  Left a message for pt if this date change does not work to let us know.  Left my name and number and apologized for the error.

## 2019-10-22 NOTE — TELEPHONE ENCOUNTER
Called and left message for patient reminding of appointment scheduled on 10/23/19 at 220pm with [facility]. Patient to arrive 15 min early. To reschedule or cancel patient to call [phone].    CHRISTOPHER Matthew

## 2019-10-23 NOTE — LETTER
"10/23/2019       RE: Linette Rosa  [address] [place] [address]     Dear Colleague,    Thank you for referring your patient, Linette Rosa, to the [facility] GASTROENTEROLOGY AND IBD CLINIC at [facility]. Please see a copy of my visit note below.    IBD CLINIC VISIT     CC/REFERRING MD:  self  REASON FOR CONSULTATION: UC  COLLABORATING PHYSICIAN: Tyrone Lai MD    IBD HISTORY  Age at diagnosis: 37  Extent of disease: Left sided   Current UC medications: None  Prior UC surgeries:   Canasa--> tolerated  Mesalamine (lialda 4.8g)--> nausea, vomiting and \"worsened diarrhea\"  Prior IBD Medications: None    DRUG MONITORING  TPMT enzyme activity: pending    6-TGN/6-MMPN levels: --    Biologic concentration:--    DISEASE ASSESSMENT  Labs:  No results found for: ALBUMIN  Recent Labs   Lab Test 10/23/19  1536   CRP 16.7*   SED 68*     Endoscopic assessment: 2019 with left sided UC (bx show moderately active chronic colitis, negative for dysplasia. Random bx of right colon normal)  2017 with proctitis, 1-2 cm just inside anal verge (rectal bx show chronic active inflammation).   Enterography: --  Fecal calprotectin: --   C diff: pending    Hearn score:   Stool freq: 2 (3-4 stools/day more than normal)  Rectal bleedin (Visible blood more than half of the time)  PGA: 2 (Moderate)  Endoscopy: Not done    Remission: <3   Mild disease: 3-5  Moderate disease: 6-10  Severe disease: >10  IBDQ Score Date IBDQ - Total Score  (Higher score better)   10/17/2019 36     ASSESSMENT/PLAN    1. Presumed UC, left sided: Endoscopic evaluation first revealed evidence of proctitis, which was shortly after her diagnosis of cervical cancer but prior to any chemo or radiation.  Subsequently patient has been on Keytruda and there has been a concern regarding aggravation of her colitis on this medication, with subsequent endoscopic evaluation in 2019 now showing progression to left-sided " colitis.  This certainly could also be the natural course of progression of her UC.  Treatment thus far has included Entocort with suboptimal response, Canasa with some response although inflammation is beyond proctitis at this point, and intolerance of p.o. 5-ASA's.  Patient is currently having active symptoms warranting a need for both immediate and ongoing maintenance therapy.  I will change her budesonide to Uceris at this time for colonic coverage. We will also retry a different 5ASA to see if she tolerates this better and pair with a rectal steroid enema.  Biologic therapy may be indicated at this time, however we will work closely with her oncologist should this be needed. Given her concurrent diagnosis with cervical cancer, anti TNFs would not be ideal, and would prefer vedolizumab versus Ustekinumab for a better safety profile if biologic therapy is to be pursued. Patient thinks she would prefer infusions over self injectables.  We will prepare for biologic start with labs and also complete stool studies for infectious studies. Case discussed with Dr. Lai who agrees with the plan.  -- Switch to Uceris 9 mg daily  -- Start asacol 2400 mg daily, if unable to tolerate will proceed with biologic, likely vedolizumab.   -- Start hydrocortisone enemas nightly  -- Labs to include CBC, LFT, CRP, ESR, TMPT, hep serologies  -- Stool studies for infectious causes    2. IBD healthcare maintenance based on patients current medication:  Vaccinations:  -- Influenza (every year): Last given 10/2019  -- TdaP (every 10 years): Last given 2015  -- Pneumococcal Pneumonia (once then every 5 years): Has not received     One time confirmation of immunity or serologies:  -- Hepatitis A (serologies or immunizations): not documented   -- Hepatitis B (serologies or immunizations): serologies pending  -- Varicella: --  -- MMR:--  -- HPV (all aged 18-26): --  -- Meningococcal meningitis (all patients at risk for meningitis): --   --  Due to the immunosuppression in this patient, I would not advise administration of live vaccines such as varicella/VZV, intranasal influenza, MMR, or yellow fever vaccine (if travelling).      Cancer Screening:  Colon cancer screening:  Left sided disease. Based on extent, would recommend colonoscopy every 2-3 years after 8 years of disease.  Dysplasia screening is recommended 2025.    Cervical cancer screening: Per OBGYN given hx of cervical cancer    Skin cancer screening: Annual visual exam of skin by dermatologist since patient is immunocompromised    Bone mineral density screening   -- Recommend all patients supplement with calcium and vitamin D    Depression Screening:  -- Over the last month, have you felt down, depressed, or hopeless? Yes  -- Over the last month, have you felt little interest or pleasure doing things? Yes    Misc:  -- Avoid tobacco use  -- Avoid NSAIDs as there is potentially a 25% chance of causing an IBD flare    RTC 4-6 weeks    Thank you for this consultation.  It was a pleasure to participate in the care of this patient; please contact us with any further questions.      Valerio Poon PA-C  Division of Gastroenterology, Hepatology and Nutrition  [facility]    HPI:   Ms. Rosa is a 39 year old year old here for follow-up for   Chief Complaint   Patient presents with     New Patient     new consult   Patient describes that her whole life she has struggled with constipation, usually having 2 bowel movements per week since she can remember with excessive bloating.  Occasionally seeing blood in the stool (painless), again for as long as she can remember.     Patient had delivered her son in 2015 and shortly after finishing breast-feeding, her menstrual cycle returned, and noticed that it was heavier than usual.  She was evaluated by her OB/GYN in February 2017 and at that time performed a LEEP procedure with newfound tumor in her cervix, and diagnosed with cervical cancer.  She  underwent chemo and internal and external radiation + in May 2017.  She had a PET scan in 2017 that showed a concern for colitis. She was evaluated by [facility] at that time. The colonoscopy at that time was performed showing proctitis for 1 to 2 cm just inside the anal verge, the remainder of the exam was normal with biopsies consistent with moderately active chronic colitis consistent with involvement by inflammatory bowel disease favor ulcerative proctitis, negative for dysplasia.  No treatment for this was pursued as she was focused on her cervical cancer treatment.    Additionally she was diagnosed with an endometrial mass in 2018 causing left hydronephrosis and had a urethral stent placement.      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  She again had radiation in May 2018.  In August 2018 there was evidence of metastasis again to her spine and she again underwent radiation.  She was started on Keytruda September 2018.  She had required pain medication which exacerbated constipation.    Beginning January 2019 she began having severe constipation, requiring multiple medications to get things moving to include enemas, magnesium citrate and daily MiraLAX.  Eventually her bowel movements became completely loose, frequent, occasional accidents and blood in the stool (even after stopping the laxatives).  She describes a sensation of frequent tenesmus. Due to the symptoms of presumed UC, Keytruda was stopped in Feb 2019 as it appeared her cancer was in remission and may have been aggravating her ulcerative colitis symptoms.  CT scan performed February 2019 showed some degree of colitis.  She had been seen by [facility] who started her on Canasa suppositories at bedtime and moved forward with repeat colonoscopy.  She did feel that the suppositories were somewhat helpful for the tenesmus.  Colonoscopy April 2019 revealed  extension of her colitis involving the entire left colon and transverse colon.  At that time she was started on Entocort 9 mg daily.  There was plan to have her slowly taper off Entocort and transition to Lialda monotherapy at a dose of 4.8 g daily.  Unfortunately she did not tolerate Lialda due to significant nausea and exacerbation of loose stool and she discontinued it. She tried to restart it again after about 10 days, but same symptoms returned.    Unfortunately she had evidence of metastasis again to her spine and ribs in May 2019 requiring radiation in June 2019.  She then restarted Keytruda in August 2019.    Approximately 6 to 7 weeks ago she began having return of frequent bloody stools with tenesmus.  She was restarted on Entocort 6 mg daily approximately 4 weeks ago with [facility] (which she continues at this time).  She describes approximately 4-5 trips to the bathroom with some passage of blood, stool and/or mucus.  She has significant tenesmus with this.  She has abdominal cramping prior to bowel movements, occasionally slightly relieved after passing a bowel movement.  The rectal pressure with the tenesmus often will cause her to vomit.    She does mention some joint pains in her right elbow and right wrist and her left knee and back.  No skin or eye manifestations at this time.  She has been instructed to avoid NSAID products at this time and has not taken any for pain control.    The patient has been working with a natural path and is currently on a low fiber, low residue diet.  She continues to require narcotic pain medication taking hydromorphone 1-2 times a day and Ativan at night. She also has Norco and Compazine prescribed but rarely takes these.    Patient was seen by [facility] this morning.  Patient regards that there was a concern regarding an element of overflow diarrhea.  Patient states that there was a plan to see what her PET CT scan shows next week to see if inflammation of  the colon was identified.    Saw [facility] this morning  Brii at [facility]    ROS:  Complete 10 System ROS performed. All are negative except as documented below, in the HPI, or in patient questionnaire from today's visit.    No fevers or chills  No weight loss  No blurry vision, double vision or change in vision  No sore throat  No lymphadenopathy  No headache, paraesthesias, or weakness in a limb  No shortness of breath or wheezing  No chest pain or pressure  No arthralgias or myalgias  No rashes or skin changes  No odynophagia or dysphagia  No BRBPR, hematochezia, melena  No dysuria, frequency or urgency  No hot/cold intolerance or polyria  No anxiety or depression    Extra intestinal manifestations of IBD:  No uveitis/episcleritis  No aphthous ulcers   No arthritis   No erythema nodosum/pyoderma gangrenosum.     PERTINENT PAST MEDICAL HISTORY:  Past Medical History:   Diagnosis Date     Abnormal Pap smear 3/14/17    Diagnosed with Cervical Cancer on April 3, 2017     Anxiety April 2017     Cancer (H) April 2017     Chronic constipation Since I can remember     Depressive disorder April 2017     Fecal incontinence April 2017     Herpes simplex without mention of complication April 2007     History of chemotherapy October 19     History of radiation therapy June 19     History of steroid therapy September 19     Postpartum depression September 2017     PREVIOUS SURGERIES:  Past Surgical History:   Procedure Laterality Date     BIOPSY  3/30/2017    As a result of abnormal pap     COLONOSCOPY  04/10/2017    As a result of Pet SCAN post cancer diagnosis, normal result       PREVIOUS ENDOSCOPY:  See HPI    ALLERGIES:     Allergies   Allergen Reactions     Bactrim [Sulfamethoxazole W/Trimethoprim]      Carica Papaya Nausea and Vomiting     Levaquin Itching     Levofloxacin Itching     Salbador Flavor Nausea and Vomiting       PERTINENT MEDICATIONS:    Current Outpatient Medications:      Ascorbic Acid (VITAMIN C) 500 MG CAPS,  Take 500 mg by mouth daily, Disp: , Rfl:      budesonide (UCERIS) 9 MG 24 hr tablet, Take 1 tablet (9 mg) by mouth every morning, Disp: 30 tablet, Rfl: 1     cholecalciferol (VITAMIN D-1000 MAX ST) 1000 UNITS TABS, Take 1,000 Units by mouth, Disp: , Rfl:      escitalopram (LEXAPRO) 10 MG tablet, 20 mg daily , Disp: , Rfl:      estradiol (VIVELLE-DOT) 0.05 MG/24HR BIW patch, Place 1 patch onto the skin twice a week, Disp: 24 patch, Rfl: 2     gabapentin (NEURONTIN) 100 MG capsule, TK 1 C PO TID, Disp: , Rfl: 3     HYDROcodone-acetaminophen (NORCO) 5-325 MG tablet, TK 1 T PO Q 4 H PRF PAIN, Disp: , Rfl: 0     HYDROmorphone (DILAUDID) 2 MG tablet, TK 1 TO 2 TS PO Q 4 H PRF PAIN, Disp: , Rfl: 0     LORazepam (ATIVAN) 1 MG tablet, TAKE 1 TABLET BY MOUTH AT BEDTIME IF NEEDED FOR ANXIETY OR SLEEP., Disp: , Rfl: 0     MAGNESIUM PO, Take 200 mg by mouth, Disp: , Rfl:      MELATONIN PO, Take by mouth daily, Disp: , Rfl:      naltrexone (DEPADE/REVIA) 50 MG tablet, , Disp: , Rfl:      Pembrolizumab (KEYTRUDA IV), , Disp: , Rfl:      prochlorperazine (COMPAZINE) 10 MG tablet, Take 10 mg by mouth, Disp: , Rfl:      progesterone (PROMETRIUM) 200 MG capsule, Take 1 capsule (200 mg) by mouth daily, Disp: 90 capsule, Rfl: 0     UNABLE TO FIND, MEDICATION NAME: Curcumin 2 tablets by mouth daily, Disp: , Rfl:     SOCIAL HISTORY:  Previously in Marketing  Social History     Socioeconomic History     Marital status:      Spouse name: Not on file     Number of children: Not on file     Years of education: Not on file     Highest education level: Not on file   Occupational History     Not on file   Social Needs     Financial resource strain: Not on file     Food insecurity:     Worry: Not on file     Inability: Not on file     Transportation needs:     Medical: Not on file     Non-medical: Not on file   Tobacco Use     Smoking status: Never Smoker     Smokeless tobacco: Never Used   Substance and Sexual Activity     Alcohol use:  "Not Currently     Comment: once a month or so     Drug use: Yes     Types: Marijuana, Hydromorphone     Sexual activity: Not Currently     Partners: Male     Birth control/protection: Post-menopausal, Male Surgical, Other   Lifestyle     Physical activity:     Days per week: Not on file     Minutes per session: Not on file     Stress: Not on file   Relationships     Social connections:     Talks on phone: Not on file     Gets together: Not on file     Attends [employer] service: Not on file     Active member of club or organization: Not on file     Attends meetings of clubs or organizations: Not on file     Relationship status: Not on file     Intimate partner violence:     Fear of current or ex partner: Not on file     Emotionally abused: Not on file     Physically abused: Not on file     Forced sexual activity: Not on file   Other Topics Concern     Not on file   Social History Narrative     Not on file       FAMILY HISTORY:  IBD: None  CRC: None   Family History   Problem Relation Age of Onset     Breast Cancer Paternal Grandmother      Breast Cancer Other      Diabetes Other      Obesity Other      Other Cancer Paternal Grandfather      Coronary Artery Disease Paternal Grandfather      Anxiety Disorder Mother      Depression Mother      Anxiety Disorder Maternal Half-Sister      Mental Illness Maternal Half-Sister      Depression Maternal Half-Sister      Mental Illness Father      Substance Abuse Father      Substance Abuse Brother      Substance Abuse Paternal Half-Brother      Substance Abuse Paternal Half-Sister        Past/family/social history reviewed and no changes    PHYSICAL EXAMINATION:  Constitutional: aaox3, cooperative, pleasant, not dyspneic/diaphoretic, no acute distress  Vitals reviewed: BP 97/53   Pulse 75   Temp 98.1  F (36.7  C) (Oral)   Ht 1.676 m (5' 6\")   Wt 61.3 kg (135 lb 1.6 oz)   SpO2 100%   BMI 21.81 kg/m     Wt:   Wt Readings from Last 2 Encounters:   10/23/19 61.3 kg (135 lb " 1.6 oz)   12/05/17 71.9 kg (158 lb 9.6 oz)      Eyes: Sclera anicteric/injected  Ears/nose/mouth/throat: Normal oropharynx without ulcers or exudate, mucus membranes moist, hearing intact  Neck: supple, thyroid normal size  CV: No edema  Respiratory: Unlabored breathing  Lymph: No axillary, submandibular, supraclavicular or inguinal lymphadenopathy  Abd: Nondistended, +bs, no hepatosplenomegaly, nontender, no peritoneal signs  Skin: warm, perfused, no jaundice  Psych: Normal affect  MSK: Normal gait      PERTINENT STUDIES:  Most recent CBC:  No lab results found.  Most recent hepatic panel:  No lab results found.    Invalid input(s): CHASE, ALP  Most recent creatinine:  No lab results found.    Answers for HPI/ROS submitted by the patient on 10/17/2019   General Symptoms: Yes  Skin Symptoms: No  HENT Symptoms: No  EYE SYMPTOMS: No  HEART SYMPTOMS: No  LUNG SYMPTOMS: No  INTESTINAL SYMPTOMS: Yes  URINARY SYMPTOMS: No  GYNECOLOGIC SYMPTOMS: No  BREAST SYMPTOMS: No  SKELETAL SYMPTOMS: Yes  BLOOD SYMPTOMS: Yes  NERVOUS SYSTEM SYMPTOMS: No  MENTAL HEALTH SYMPTOMS: Yes  Fever: No  Loss of appetite: Yes  Weight loss: No  Weight gain: No  Fatigue: Yes  Night sweats: Yes  Chills: Yes  Increased stress: Yes  Excessive hunger: No  Excessive thirst: No  Feeling hot or cold when others believe the temperature is normal: Yes  Loss of height: No  Post-operative complications: No  Surgical site pain: No  Hallucinations: No  Change in or Loss of Energy: Yes  Hyperactivity: No  Confusion: No  Heart burn or indigestion: Yes  Nausea: Yes  Vomiting: Yes  Abdominal pain: Yes  Bloating: Yes  Constipation: Yes  Diarrhea: Yes  Blood in stool: Yes  Black stools: No  Fecal incontinence: Yes  Yellowing of skin or eyes: No  Vomit with blood: No  Change in stools: Yes  Back pain: Yes  Muscle aches: Yes  Neck pain: Yes  Swollen joints: Yes  Joint pain: No  Bone pain: Yes  Muscle cramps: Yes  Muscle weakness: No  Joint stiffness: No  Bone fracture:  No  Anemia: No  Swollen glands: No  Easy bleeding or bruising: No  Edema or swelling: No  Nervous or Anxious: Yes  Depression: Yes  Trouble sleeping: Yes  Trouble thinking or concentrating: Yes  Mood changes: Yes  Panic attacks: No      Again, thank you for allowing me to participate in the care of your patient.      Sincerely,    Valerio Poon PA-C

## 2019-10-23 NOTE — PATIENT INSTRUCTIONS
It was a pleasure taking care of you today.  I've included a brief summary of our discussion and care plan from today's visit below.  Please review this information with your primary care provider.  ______________________________________________________________________    My recommendations are summarized as follows:    -- Start Uceris 9 mg   -- Labs today  -- stool sample when able   -- Next endoscopic assessment: about 6 months   -- Patient with IBD we recommend supplementation vitamin D 1000 units daily and calcium 500 mg twice daily.  -- No NSAIDs (ibuprofen, or anything containing ibuprofen)     For additional resources about inflammatory bowel disease visit [email or web address]    Return to GI Clinic in 4 weeks to review your progress.    ______________________________________________________________________    Who do I call with any questions after my visit?  Please be in touch if there are any further questions that arise following today's visit.  There are multiple ways to contact your gastroenterology care team.        During business hours, you may reach a Gastroenterology nurse at [phone].       To schedule or reschedule an appointment, please call [phone].       You can always send a secure message through [employer].  [employer] messages are answered by your nurse or doctor typically within 24 hours.  Please allow extra time on weekends and holidays.        For urgent/emergent questions after business hours, you may reach the on-call GI Fellow by contacting the [facility]  at [phone].      In order for your refill to be processed in a timely fashion, it is your responsibility to ensure you follow the recommendations from your provider regarding your laboratory studies and follow up appointments.       How will I get the results of any tests ordered?    You will receive all of your results.  If you have signed up for [employer], any tests ordered at your visit  will be available to you after your physician reviews them.  Typically this takes 1-2 weeks.  If there are urgent results that require a change in your care plan, your physician or nurse will call you to discuss the next steps.      What is [employer]?  [email or web address] is a secure way for you to access all of your healthcare records from the [facility].  It is a web based computer program, so you can sign on to it from any location.  It also allows you to send secure messages to your care team.  I recommend signing up for [email or web address] access if you have not already done so and are comfortable with using a computer.      How to I schedule a follow-up visit?  If you did not schedule a follow-up visit today, please call [phone] to schedule a follow-up office visit.        Sincerely,    Valerio Poon PA-C  [facility]  Division of Gastroenterology

## 2019-10-23 NOTE — PROGRESS NOTES
"IBD CLINIC VISIT     CC/REFERRING MD:  self  REASON FOR CONSULTATION: UC  COLLABORATING PHYSICIAN: Tyrone Lai MD    IBD HISTORY  Age at diagnosis: 37  Extent of disease: Left sided   Current UC medications: None  Prior UC surgeries:   Canasa--> tolerated  Mesalamine (lialda 4.8g)--> nausea, vomiting and \"worsened diarrhea\"  Prior IBD Medications: None    DRUG MONITORING  TPMT enzyme activity: pending    6-TGN/6-MMPN levels: --    Biologic concentration:--    DISEASE ASSESSMENT  Labs:  No results found for: ALBUMIN  Recent Labs   Lab Test 10/23/19  1536   CRP 16.7*   SED 68*     Endoscopic assessment: 2019 with left sided UC (bx show moderately active chronic colitis, negative for dysplasia. Random bx of right colon normal)  2017 with proctitis, 1-2 cm just inside anal verge (rectal bx show chronic active inflammation).   Enterography: --  Fecal calprotectin: --   C diff: pending    Hearn score:   Stool freq: 2 (3-4 stools/day more than normal)  Rectal bleedin (Visible blood more than half of the time)  PGA: 2 (Moderate)  Endoscopy: Not done    Remission: <3   Mild disease: 3-5  Moderate disease: 6-10  Severe disease: >10  IBDQ Score Date IBDQ - Total Score  (Higher score better)   10/17/2019 36     ASSESSMENT/PLAN    1. Presumed UC, left sided: Endoscopic evaluation first revealed evidence of proctitis, which was shortly after her diagnosis of cervical cancer but prior to any chemo or radiation.  Subsequently patient has been on Keytruda and there has been a concern regarding aggravation of her colitis on this medication, with subsequent endoscopic evaluation in 2019 now showing progression to left-sided colitis.  This certainly could also be the natural course of progression of her UC.  Treatment thus far has included Entocort with suboptimal response, Canasa with some response although inflammation is beyond proctitis at this point, and intolerance of p.o. 5-ASA's.  Patient is currently having active " symptoms warranting a need for both immediate and ongoing maintenance therapy.  I will change her budesonide to Uceris at this time for colonic coverage. We will also retry a different 5ASA to see if she tolerates this better and pair with a rectal steroid enema.  Biologic therapy may be indicated at this time, however we will work closely with her oncologist should this be needed. Given her concurrent diagnosis with cervical cancer, anti TNFs would not be ideal, and would prefer vedolizumab versus Ustekinumab for a better safety profile if biologic therapy is to be pursued. Patient thinks she would prefer infusions over self injectables.  We will prepare for biologic start with labs and also complete stool studies for infectious studies. Case discussed with Dr. Lai who agrees with the plan.  -- Switch to Uceris 9 mg daily  -- Start asacol 2400 mg daily, if unable to tolerate will proceed with biologic, likely vedolizumab.   -- Start hydrocortisone enemas nightly  -- Labs to include CBC, LFT, CRP, ESR, TMPT, hep serologies  -- Stool studies for infectious causes    2. IBD healthcare maintenance based on patients current medication:  Vaccinations:  -- Influenza (every year): Last given 10/2019  -- TdaP (every 10 years): Last given 2015  -- Pneumococcal Pneumonia (once then every 5 years): Has not received     One time confirmation of immunity or serologies:  -- Hepatitis A (serologies or immunizations): not documented   -- Hepatitis B (serologies or immunizations): serologies pending  -- Varicella: --  -- MMR:--  -- HPV (all aged 18-26): --  -- Meningococcal meningitis (all patients at risk for meningitis): --   -- Due to the immunosuppression in this patient, I would not advise administration of live vaccines such as varicella/VZV, intranasal influenza, MMR, or yellow fever vaccine (if travelling).      Cancer Screening:  Colon cancer screening:  Left sided disease. Based on extent, would recommend colonoscopy  every 2-3 years after 8 years of disease.  Dysplasia screening is recommended 2025.    Cervical cancer screening: Per OBGYN given hx of cervical cancer    Skin cancer screening: Annual visual exam of skin by dermatologist since patient is immunocompromised    Bone mineral density screening   -- Recommend all patients supplement with calcium and vitamin D    Depression Screening:  -- Over the last month, have you felt down, depressed, or hopeless? Yes  -- Over the last month, have you felt little interest or pleasure doing things? Yes    Misc:  -- Avoid tobacco use  -- Avoid NSAIDs as there is potentially a 25% chance of causing an IBD flare    RTC 4-6 weeks    Thank you for this consultation.  It was a pleasure to participate in the care of this patient; please contact us with any further questions.      Valerio Poon PA-C  Division of Gastroenterology, Hepatology and Nutrition  [facility]    HPI:   Ms. Rosa is a 39 year old year old here for follow-up for   Chief Complaint   Patient presents with     New Patient     new consult   Patient describes that her whole life she has struggled with constipation, usually having 2 bowel movements per week since she can remember with excessive bloating.  Occasionally seeing blood in the stool (painless), again for as long as she can remember.     Patient had delivered her son in 2015 and shortly after finishing breast-feeding, her menstrual cycle returned, and noticed that it was heavier than usual.  She was evaluated by her OB/GYN in February 2017 and at that time performed a LEEP procedure with newfound tumor in her cervix, and diagnosed with cervical cancer.  She underwent chemo and internal and external radiation + in May 2017.  She had a PET scan in 2017 that showed a concern for colitis. She was evaluated by [facility] at that time. The colonoscopy at that time was performed showing proctitis for 1 to 2 cm just inside the anal verge, the remainder of the exam was  normal with biopsies consistent with moderately active chronic colitis consistent with involvement by inflammatory bowel disease favor ulcerative proctitis, negative for dysplasia.  No treatment for this was pursued as she was focused on her cervical cancer treatment.    Additionally she was diagnosed with an endometrial mass in 2018 causing left hydronephrosis and had a urethral stent placement.      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  She again had radiation in May 2018.  In August 2018 there was evidence of metastasis again to her spine and she again underwent radiation.  She was started on Keytruda September 2018.  She had required pain medication which exacerbated constipation.    Beginning January 2019 she began having severe constipation, requiring multiple medications to get things moving to include enemas, magnesium citrate and daily MiraLAX.  Eventually her bowel movements became completely loose, frequent, occasional accidents and blood in the stool (even after stopping the laxatives).  She describes a sensation of frequent tenesmus. Due to the symptoms of presumed UC, Keytruda was stopped in Feb 2019 as it appeared her cancer was in remission and may have been aggravating her ulcerative colitis symptoms.  CT scan performed February 2019 showed some degree of colitis.  She had been seen by [facility] who started her on Canasa suppositories at bedtime and moved forward with repeat colonoscopy.  She did feel that the suppositories were somewhat helpful for the tenesmus.  Colonoscopy April 2019 revealed extension of her colitis involving the entire left colon and transverse colon.  At that time she was started on Entocort 9 mg daily.  There was plan to have her slowly taper off Entocort and transition to Lialda monotherapy at a dose of 4.8 g daily.  Unfortunately she did not tolerate Lialda due to  significant nausea and exacerbation of loose stool and she discontinued it. She tried to restart it again after about 10 days, but same symptoms returned.    Unfortunately she had evidence of metastasis again to her spine and ribs in May 2019 requiring radiation in June 2019.  She then restarted Keytruda in August 2019.    Approximately 6 to 7 weeks ago she began having return of frequent bloody stools with tenesmus.  She was restarted on Entocort 6 mg daily approximately 4 weeks ago with [facility] (which she continues at this time).  She describes approximately 4-5 trips to the bathroom with some passage of blood, stool and/or mucus.  She has significant tenesmus with this.  She has abdominal cramping prior to bowel movements, occasionally slightly relieved after passing a bowel movement.  The rectal pressure with the tenesmus often will cause her to vomit.    She does mention some joint pains in her right elbow and right wrist and her left knee and back.  No skin or eye manifestations at this time.  She has been instructed to avoid NSAID products at this time and has not taken any for pain control.    The patient has been working with a natural path and is currently on a low fiber, low residue diet.  She continues to require narcotic pain medication taking hydromorphone 1-2 times a day and Ativan at night. She also has Norco and Compazine prescribed but rarely takes these.    Patient was seen by [facility] this morning.  Patient regards that there was a concern regarding an element of overflow diarrhea.  Patient states that there was a plan to see what her PET CT scan shows next week to see if inflammation of the colon was identified.    Saw [facility] this morning  Brii at [facility]    ROS:  Complete 10 System ROS performed. All are negative except as documented below, in the HPI, or in patient questionnaire from today's visit.    No fevers or chills  No weight loss  No blurry vision, double vision or change  in vision  No sore throat  No lymphadenopathy  No headache, paraesthesias, or weakness in a limb  No shortness of breath or wheezing  No chest pain or pressure  No arthralgias or myalgias  No rashes or skin changes  No odynophagia or dysphagia  No BRBPR, hematochezia, melena  No dysuria, frequency or urgency  No hot/cold intolerance or polyria  No anxiety or depression    Extra intestinal manifestations of IBD:  No uveitis/episcleritis  No aphthous ulcers   No arthritis   No erythema nodosum/pyoderma gangrenosum.     PERTINENT PAST MEDICAL HISTORY:  Past Medical History:   Diagnosis Date     Abnormal Pap smear 3/14/17    Diagnosed with Cervical Cancer on April 3, 2017     Anxiety April 2017     Cancer (H) April 2017     Chronic constipation Since I can remember     Depressive disorder April 2017     Fecal incontinence April 2017     Herpes simplex without mention of complication April 2007     History of chemotherapy October 19     History of radiation therapy June 19     History of steroid therapy September 19     Postpartum depression September 2017     PREVIOUS SURGERIES:  Past Surgical History:   Procedure Laterality Date     BIOPSY  3/30/2017    As a result of abnormal pap     COLONOSCOPY  04/10/2017    As a result of Pet SCAN post cancer diagnosis, normal result       PREVIOUS ENDOSCOPY:  See HPI    ALLERGIES:     Allergies   Allergen Reactions     Bactrim [Sulfamethoxazole W/Trimethoprim]      Carica Papaya Nausea and Vomiting     Levaquin Itching     Levofloxacin Itching     Salbador Flavor Nausea and Vomiting       PERTINENT MEDICATIONS:    Current Outpatient Medications:      Ascorbic Acid (VITAMIN C) 500 MG CAPS, Take 500 mg by mouth daily, Disp: , Rfl:      budesonide (UCERIS) 9 MG 24 hr tablet, Take 1 tablet (9 mg) by mouth every morning, Disp: 30 tablet, Rfl: 1     cholecalciferol (VITAMIN D-1000 MAX ST) 1000 UNITS TABS, Take 1,000 Units by mouth, Disp: , Rfl:      escitalopram (LEXAPRO) 10 MG tablet, 20  mg daily , Disp: , Rfl:      estradiol (VIVELLE-DOT) 0.05 MG/24HR BIW patch, Place 1 patch onto the skin twice a week, Disp: 24 patch, Rfl: 2     gabapentin (NEURONTIN) 100 MG capsule, TK 1 C PO TID, Disp: , Rfl: 3     HYDROcodone-acetaminophen (NORCO) 5-325 MG tablet, TK 1 T PO Q 4 H PRF PAIN, Disp: , Rfl: 0     HYDROmorphone (DILAUDID) 2 MG tablet, TK 1 TO 2 TS PO Q 4 H PRF PAIN, Disp: , Rfl: 0     LORazepam (ATIVAN) 1 MG tablet, TAKE 1 TABLET BY MOUTH AT BEDTIME IF NEEDED FOR ANXIETY OR SLEEP., Disp: , Rfl: 0     MAGNESIUM PO, Take 200 mg by mouth, Disp: , Rfl:      MELATONIN PO, Take by mouth daily, Disp: , Rfl:      naltrexone (DEPADE/REVIA) 50 MG tablet, , Disp: , Rfl:      Pembrolizumab (KEYTRUDA IV), , Disp: , Rfl:      prochlorperazine (COMPAZINE) 10 MG tablet, Take 10 mg by mouth, Disp: , Rfl:      progesterone (PROMETRIUM) 200 MG capsule, Take 1 capsule (200 mg) by mouth daily, Disp: 90 capsule, Rfl: 0     UNABLE TO FIND, MEDICATION NAME: Curcumin 2 tablets by mouth daily, Disp: , Rfl:     SOCIAL HISTORY:  Previously in Marketing  Social History     Socioeconomic History     Marital status:      Spouse name: Not on file     Number of children: Not on file     Years of education: Not on file     Highest education level: Not on file   Occupational History     Not on file   Social Needs     Financial resource strain: Not on file     Food insecurity:     Worry: Not on file     Inability: Not on file     Transportation needs:     Medical: Not on file     Non-medical: Not on file   Tobacco Use     Smoking status: Never Smoker     Smokeless tobacco: Never Used   Substance and Sexual Activity     Alcohol use: Not Currently     Comment: once a month or so     Drug use: Yes     Types: Marijuana, Hydromorphone     Sexual activity: Not Currently     Partners: Male     Birth control/protection: Post-menopausal, Male Surgical, Other   Lifestyle     Physical activity:     Days per week: Not on file     Minutes  "per session: Not on file     Stress: Not on file   Relationships     Social connections:     Talks on phone: Not on file     Gets together: Not on file     Attends Restorationism service: Not on file     Active member of club or organization: Not on file     Attends meetings of clubs or organizations: Not on file     Relationship status: Not on file     Intimate partner violence:     Fear of current or ex partner: Not on file     Emotionally abused: Not on file     Physically abused: Not on file     Forced sexual activity: Not on file   Other Topics Concern     Not on file   Social History Narrative     Not on file       FAMILY HISTORY:  IBD: None  CRC: None   Family History   Problem Relation Age of Onset     Breast Cancer Paternal Grandmother      Breast Cancer Other      Diabetes Other      Obesity Other      Other Cancer Paternal Grandfather      Coronary Artery Disease Paternal Grandfather      Anxiety Disorder Mother      Depression Mother      Anxiety Disorder Maternal Half-Sister      Mental Illness Maternal Half-Sister      Depression Maternal Half-Sister      Mental Illness Father      Substance Abuse Father      Substance Abuse Brother      Substance Abuse Paternal Half-Brother      Substance Abuse Paternal Half-Sister        Past/family/social history reviewed and no changes    PHYSICAL EXAMINATION:  Constitutional: aaox3, cooperative, pleasant, not dyspneic/diaphoretic, no acute distress  Vitals reviewed: BP 97/53   Pulse 75   Temp 98.1  F (36.7  C) (Oral)   Ht 1.676 m (5' 6\")   Wt 61.3 kg (135 lb 1.6 oz)   SpO2 100%   BMI 21.81 kg/m    Wt:   Wt Readings from Last 2 Encounters:   10/23/19 61.3 kg (135 lb 1.6 oz)   12/05/17 71.9 kg (158 lb 9.6 oz)      Eyes: Sclera anicteric/injected  Ears/nose/mouth/throat: Normal oropharynx without ulcers or exudate, mucus membranes moist, hearing intact  Neck: supple, thyroid normal size  CV: No edema  Respiratory: Unlabored breathing  Lymph: No axillary, " submandibular, supraclavicular or inguinal lymphadenopathy  Abd: Nondistended, +bs, no hepatosplenomegaly, nontender, no peritoneal signs  Skin: warm, perfused, no jaundice  Psych: Normal affect  MSK: Normal gait      PERTINENT STUDIES:  Most recent CBC:  No lab results found.  Most recent hepatic panel:  No lab results found.    Invalid input(s): CHASE, ALP  Most recent creatinine:  No lab results found.          Answers for HPI/ROS submitted by the patient on 10/17/2019   General Symptoms: Yes  Skin Symptoms: No  HENT Symptoms: No  EYE SYMPTOMS: No  HEART SYMPTOMS: No  LUNG SYMPTOMS: No  INTESTINAL SYMPTOMS: Yes  URINARY SYMPTOMS: No  GYNECOLOGIC SYMPTOMS: No  BREAST SYMPTOMS: No  SKELETAL SYMPTOMS: Yes  BLOOD SYMPTOMS: Yes  NERVOUS SYSTEM SYMPTOMS: No  MENTAL HEALTH SYMPTOMS: Yes  Fever: No  Loss of appetite: Yes  Weight loss: No  Weight gain: No  Fatigue: Yes  Night sweats: Yes  Chills: Yes  Increased stress: Yes  Excessive hunger: No  Excessive thirst: No  Feeling hot or cold when others believe the temperature is normal: Yes  Loss of height: No  Post-operative complications: No  Surgical site pain: No  Hallucinations: No  Change in or Loss of Energy: Yes  Hyperactivity: No  Confusion: No  Heart burn or indigestion: Yes  Nausea: Yes  Vomiting: Yes  Abdominal pain: Yes  Bloating: Yes  Constipation: Yes  Diarrhea: Yes  Blood in stool: Yes  Black stools: No  Fecal incontinence: Yes  Yellowing of skin or eyes: No  Vomit with blood: No  Change in stools: Yes  Back pain: Yes  Muscle aches: Yes  Neck pain: Yes  Swollen joints: Yes  Joint pain: No  Bone pain: Yes  Muscle cramps: Yes  Muscle weakness: No  Joint stiffness: No  Bone fracture: No  Anemia: No  Swollen glands: No  Easy bleeding or bruising: No  Edema or swelling: No  Nervous or Anxious: Yes  Depression: Yes  Trouble sleeping: Yes  Trouble thinking or concentrating: Yes  Mood changes: Yes  Panic attacks: No

## 2019-10-28 NOTE — PROGRESS NOTES
Spoke with Dr. Weldon regarding treatment plan. She is in agreement with our plan (trying PO 5ASA again if not then proceed with vedolizumab).  Will update her if course changes.      Valerio Poon PA-C  Division of Gastroenterology, Hepatology and Nutrition  [facility]

## 2019-11-08 NOTE — PROGRESS NOTES
Contacted pt per request from Ms. Poon to schedule pt with Ms. Poon  on a day that Dr. Lai is in the clinic.   Left a message that took the liberty of scheduling pt on November 19 at 11 am with Ms. Lutz. Left my name and number if this date and time did not work.

## 2019-11-14 NOTE — PROGRESS NOTES
This is a recent snapshot of the patient's [facility] medical record.  For current drug dose and complete information and questions, call [phone] or In Basket pool, fv home infusion ([id number])  CSN Number:  [id number]

## 2019-11-18 NOTE — TELEPHONE ENCOUNTER
Called and left message for patient reminding of appointment scheduled on 11/20/19 at 11am with [facility] GI clinic. Patient to arrive 15 min early. To reschedule or cancel patient to call [phone].    CHRISTOPHER Matthew

## 2019-11-20 NOTE — PROGRESS NOTES
This is a recent snapshot of the patient's [facility] medical record.  For current drug dose and complete information and questions, call [phone] or In Basket pool, fv home infusion ([id number])  CSN Number:  [id number]

## 2019-11-27 NOTE — PROGRESS NOTES
This is a recent snapshot of the patient's [facility] medical record.  For current drug dose and complete information and questions, call [phone] or In Basket pool, fv home infusion ([id number])  CSN Number:  [id number]

## 2019-11-27 NOTE — PROGRESS NOTES
This is a recent snapshot of the patient's [facility] medical record.  For current drug dose and complete information and questions, call [phone] or In Basket pool, fv home infusion ([id number])  CSN Number:  [id number]

## 2019-12-05 NOTE — PROGRESS NOTES
This is a recent snapshot of the patient's [facility] medical record.  For current drug dose and complete information and questions, call [phone] or In Basket pool, fv home infusion ([id number])  CSN Number:  [id number]

## 2019-12-09 NOTE — PROGRESS NOTES
This is a recent snapshot of the patient's [facility] medical record.  For current drug dose and complete information and questions, call [phone] or In Basket pool, fv home infusion ([id number])  CSN Number:  [id number]

## 2019-12-09 NOTE — PROGRESS NOTES
This is a recent snapshot of the patient's [facility] medical record.  For current drug dose and complete information and questions, call [phone] or In Basket pool, fv home infusion ([id number])  CSN Number:  [id number]

## 2019-12-13 NOTE — PROGRESS NOTES
This is a recent snapshot of the patient's [facility] medical record.  For current drug dose and complete information and questions, call [phone] or In [facility] pool, fv home infusion ([id number])  CSN Number:  [id number]

## 2019-12-16 NOTE — PROGRESS NOTES
This is a recent snapshot of the patient's [facility] medical record.  For current drug dose and complete information and questions, call [phone] or In Basket pool, fv home infusion ([id number])  CSN Number:  [id number]

## 2019-12-20 NOTE — PROGRESS NOTES
This is a recent snapshot of the patient's [facility] medical record.  For current drug dose and complete information and questions, call [phone] or In Basket pool, fv home infusion ([id number])  CSN Number:  [id number]

## 2019-12-23 NOTE — PROGRESS NOTES
This is a recent snapshot of the patient's [facility] medical record.  For current drug dose and complete information and questions, call [phone] or In Basket pool, fv home infusion ([id number])  CSN Number:  [id number]

## 2019-12-27 NOTE — TELEPHONE ENCOUNTER
Called and left message for patient reminding of appointment scheduled on 12/30/19 at 1140am with [facility] GI clinic. Patient to arrive 15 min early. To reschedule or cancel patient to call [phone].    CHRISTOPHER Matthew

## 2019-12-30 NOTE — PROGRESS NOTES
"IBD CLINIC VISIT     CC/REFERRING MD:  self  REASON FOR CONSULTATION: UC  COLLABORATING PHYSICIAN: Tyrone Lai MD    IBD HISTORY  Age at diagnosis: 37  Extent of disease: Left sided   Current UC medications: None  Prior UC surgeries:   Canasa--> tolerated  Mesalamine (lialda 4.8g)--> nausea, vomiting and \"worsened diarrhea\"  Prior IBD Medications: None    DRUG MONITORING  TPMT enzyme activity: pending    6-TGN/6-MMPN levels: --    Biologic concentration:--    DISEASE ASSESSMENT  Labs:  No results found for: ALBUMIN  Recent Labs   Lab Test 10/23/19  1536   CRP 16.7*   SED 68*     Endoscopic assessment: 2019 with left sided UC (bx show moderately active chronic colitis, negative for dysplasia. Random bx of right colon normal)  2017 with proctitis, 1-2 cm just inside anal verge (rectal bx show chronic active inflammation).   Enterography: --  Fecal calprotectin: --   C diff: pending    Hearn score:   Stool freq: 0 (baseline stools frequency)  Rectal bleedin (None)  PGA: 0 (normal)  Endoscopy: Not done    Remission: <3   Mild disease: 3-5  Moderate disease: 6-10  Severe disease: >10    IBDQ Score Date IBDQ - Total Score  (Higher score better)   2019 59   10/17/2019 36     ASSESSMENT/PLAN    1. Presumed UC, left sided: Endoscopic evaluation first revealed evidence of proctitis, which was shortly after her diagnosis of cervical cancer but prior to any chemo or radiation.  While on Keytruda there was noted to be progression to left-sided colitis (2019), which certainly could be a natural course of progression of her UC.  Fortunately she has symptomatically done well since discontinuing Keytruda.  Patient has continued with Uceris PO 9 mg daily.  Patient interested in seeing if she can discontinue this, which I think is reasonable.  Should she become symptomatic consideration to restart an alternative 5-ASA or escalation of therapy to biologic, likely vedolizumab versus Ustekinumab for better safety " profile.  Patient would prefer infusions over injectables.  -- May discontinue Uceris at this time and closely monitor symtoms  -- Consideration for asacol 2400 mg daily if patient becomes symptomatic, if unable to tolerate will proceed with biologic, likely vedolizumab.     2. IBD healthcare maintenance based on patients current medication:    Vaccinations:  -- Influenza (every year): Last given 10/2019  -- TdaP (every 10 years): Last given 2015  -- Pneumococcal Pneumonia (once then every 5 years): Has not received     One time confirmation of immunity or serologies:  -- Hepatitis A (serologies or immunizations): not documented   -- Hepatitis B (serologies or immunizations): serologies pending  -- Varicella: --  -- MMR:--  -- HPV (all aged 18-26): --  -- Meningococcal meningitis (all patients at risk for meningitis): --   -- Due to the immunosuppression in this patient, I would not advise administration of live vaccines such as varicella/VZV, intranasal influenza, MMR, or yellow fever vaccine (if travelling).      Cancer Screening:  Colon cancer screening:  Left sided disease. Based on extent, would recommend colonoscopy every 2-3 years after 8 years of disease.  Dysplasia screening is recommended 2025.    Cervical cancer screening: Per OBGYN given hx of cervical cancer    Skin cancer screening: Annual visual exam of skin by dermatologist since patient is immunocompromised    Bone mineral density screening   -- Recommend all patients supplement with calcium and vitamin D    Depression Screening:  -- Over the last month, have you felt down, depressed, or hopeless? Yes  -- Over the last month, have you felt little interest or pleasure doing things? Yes    Misc:  -- Avoid tobacco use  -- Avoid NSAIDs as there is potentially a 25% chance of causing an IBD flare    RTC 3 months    Thank you for this consultation.  It was a pleasure to participate in the care of this patient; please contact us with any further questions.       Valerio Poon PA-C  Division of Gastroenterology, Hepatology and Nutrition  [facility]    HPI:   Ms. Rosa is a 39 year old year old here for follow-up for   Chief Complaint   Patient presents with     RECHECK     follow up IBD   Patient describes that her whole life she has struggled with constipation, usually having 2 bowel movements per week since she can remember with excessive bloating.  Occasionally seeing blood in the stool (painless), again for as long as she can remember.     Patient had delivered her son in 2015 and shortly after finishing breast-feeding, her menstrual cycle returned, and noticed that it was heavier than usual.  She was evaluated by her OB/GYN in February 2017 and at that time performed a LEEP procedure with newfound tumor in her cervix, and diagnosed with cervical cancer.  She underwent chemo and internal and external radiation + in May 2017.  She had a PET scan in 2017 that showed a concern for colitis. She was evaluated by [facility] at that time. The colonoscopy at that time was performed showing proctitis for 1 to 2 cm just inside the anal verge, the remainder of the exam was normal with biopsies consistent with moderately active chronic colitis consistent with involvement by inflammatory bowel disease favor ulcerative proctitis, negative for dysplasia.  No treatment for this was pursued as she was focused on her cervical cancer treatment.    Additionally she was diagnosed with an endometrial mass in 2018 causing left hydronephrosis and had a urethral stent placement.      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  She again had radiation in May 2018.  In August 2018 there was evidence of metastasis again to her spine and she again underwent radiation.  She was started on Keytruda September 2018.  She had required pain medication which exacerbated constipation.    Beginning January 2019 she  began having severe constipation, requiring multiple medications to get things moving to include enemas, magnesium citrate and daily MiraLAX.  Eventually her bowel movements became completely loose, frequent, occasional accidents and blood in the stool (even after stopping the laxatives).  She describes a sensation of frequent tenesmus. Due to the symptoms of presumed UC, Keytruda was stopped in Feb 2019 as it appeared her cancer was in remission and may have been aggravating her ulcerative colitis symptoms.  CT scan performed February 2019 showed some degree of colitis.  She had been seen by [facility] who started her on Canasa suppositories at bedtime and moved forward with repeat colonoscopy.  She did feel that the suppositories were somewhat helpful for the tenesmus.  Colonoscopy April 2019 revealed extension of her colitis involving the entire left colon and transverse colon.  At that time she was started on Entocort 9 mg daily.  There was plan to have her slowly taper off Entocort and transition to Lialda monotherapy at a dose of 4.8 g daily.  Unfortunately she did not tolerate Lialda due to significant nausea and exacerbation of loose stool and she discontinued it. She tried to restart it again after about 10 days, but same symptoms returned.    Unfortunately she had evidence of metastasis again to her spine and ribs in May 2019 requiring radiation in June 2019.  She then restarted Keytruda in August 2019.    Around September 2019, she began having return of frequent bloody stools with tenesmus.  She was restarted on Entocort 6 mg daily approximately 4 weeks ago with [facility].  At that time, she was having 4-5 trips to the bathroom with some passage of blood, stool and/or mucus with significant tenesmus. The rectal pressure with the tenesmus often will cause her to vomit.    She does mention some joint pains in her right elbow and right wrist and her left knee and back.  No skin or eye  manifestations at this time.  She has been instructed to avoid NSAID products at this time and has not taken any for pain control.    The patient has also been working with a natural path and is currently on a low fiber, low residue diet.  She continues to require narcotic pain medication taking hydromorphone 1-2 times a day and Ativan at night. She also has Norco and Compazine prescribed but rarely takes these.    Patient has also been seen by [facility] the same day as previous consult.  Patient notes she was told that there was a concern regarding an element of overflow diarrhea.      Interval history 12/30/19  Patient did not start the Asacol but continued with budesonide (Uceris) 9 mg daily.  She has since stopped the Keytruda, however unfortunately there has been further advancement in metastasis.  No plans of continuing Keytruda, moving forward with alternative therapy.  Since discontinuing Keytruda, this has allowed for patient to move to 1-2 formed stools per day without blood in the stool.  She denies any tenesmus or urgency.  She denies any nighttime stools.    ROS:  Complete 10 System ROS performed. All are negative except as documented below, in the HPI, or in patient questionnaire from today's visit.    No fevers or chills  No weight loss  No blurry vision, double vision or change in vision  No sore throat  No lymphadenopathy  No headache, paraesthesias, or weakness in a limb  No shortness of breath or wheezing  No chest pain or pressure  No arthralgias or myalgias  No rashes or skin changes  No odynophagia or dysphagia  No BRBPR, hematochezia, melena  No dysuria, frequency or urgency  No hot/cold intolerance or polyria  No anxiety or depression    Extra intestinal manifestations of IBD:  No uveitis/episcleritis  No aphthous ulcers   No arthritis   No erythema nodosum/pyoderma gangrenosum.     PERTINENT PAST MEDICAL HISTORY:  Past Medical History:   Diagnosis Date     Abnormal Pap smear  3/14/17    Diagnosed with Cervical Cancer on April 3, 2017     Anxiety April 2017     Cancer (H) April 2017     Chronic constipation Since I can remember     Depressive disorder April 2017     Fecal incontinence April 2017     Herpes simplex without mention of complication April 2007     History of chemotherapy October 19     History of radiation therapy June 19     History of steroid therapy September 19     Postpartum depression September 2017     PREVIOUS SURGERIES:  Past Surgical History:   Procedure Laterality Date     BIOPSY  3/30/2017    As a result of abnormal pap     COLONOSCOPY  04/10/2017    As a result of Pet SCAN post cancer diagnosis, normal result       PREVIOUS ENDOSCOPY:  See HPI    ALLERGIES:     Allergies   Allergen Reactions     Bactrim [Sulfamethoxazole W/Trimethoprim]      Carica Papaya Nausea and Vomiting     Levaquin Itching     Levofloxacin Itching     Salbador Flavor Nausea and Vomiting       PERTINENT MEDICATIONS:    Current Outpatient Medications:      Ascorbic Acid (VITAMIN C) 500 MG CAPS, Take 500 mg by mouth daily, Disp: , Rfl:      budesonide (UCERIS) 9 MG 24 hr tablet, Take 1 tablet (9 mg) by mouth every morning, Disp: 30 tablet, Rfl: 1     cholecalciferol (VITAMIN D-1000 MAX ST) 1000 UNITS TABS, Take 1,000 Units by mouth, Disp: , Rfl:      escitalopram (LEXAPRO) 10 MG tablet, 20 mg daily , Disp: , Rfl:      estradiol (VIVELLE-DOT) 0.05 MG/24HR BIW patch, Place 1 patch onto the skin twice a week, Disp: 24 patch, Rfl: 2     gabapentin (NEURONTIN) 100 MG capsule, TK 1 C PO TID, Disp: , Rfl: 3     HYDROcodone-acetaminophen (NORCO) 5-325 MG tablet, TK 1 T PO Q 4 H PRF PAIN, Disp: , Rfl: 0     hydrocortisone (CORTENEMA) 100 MG/60ML enema, Place 1 enema rectally At Bedtime, Disp: 30 enema, Rfl: 3     HYDROmorphone (DILAUDID) 2 MG tablet, TK 1 TO 2 TS PO Q 4 H PRF PAIN, Disp: , Rfl: 0     LORazepam (ATIVAN) 1 MG tablet, TAKE 1 TABLET BY MOUTH AT BEDTIME IF NEEDED FOR ANXIETY OR SLEEP., Disp: ,  Rfl: 0     MAGNESIUM PO, Take 200 mg by mouth, Disp: , Rfl:      MELATONIN PO, Take by mouth daily, Disp: , Rfl:      mesalamine (ASACOL HD) 800 MG EC tablet, Take 3 tablets (2,400 mg) by mouth daily, Disp: 90 tablet, Rfl: 0     naltrexone (DEPADE/REVIA) 50 MG tablet, , Disp: , Rfl:      Pembrolizumab (KEYTRUDA IV), , Disp: , Rfl:      prochlorperazine (COMPAZINE) 10 MG tablet, Take 10 mg by mouth, Disp: , Rfl:      progesterone (PROMETRIUM) 200 MG capsule, Take 1 capsule (200 mg) by mouth daily, Disp: 90 capsule, Rfl: 0     UNABLE TO FIND, MEDICATION NAME: Curcumin 2 tablets by mouth daily, Disp: , Rfl:     SOCIAL HISTORY:  Previously in Marketing  Social History     Socioeconomic History     Marital status:      Spouse name: Not on file     Number of children: Not on file     Years of education: Not on file     Highest education level: Not on file   Occupational History     Not on file   Social Needs     Financial resource strain: Not on file     Food insecurity:     Worry: Not on file     Inability: Not on file     Transportation needs:     Medical: Not on file     Non-medical: Not on file   Tobacco Use     Smoking status: Never Smoker     Smokeless tobacco: Never Used   Substance and Sexual Activity     Alcohol use: Not Currently     Comment: once a month or so     Drug use: Yes     Types: Marijuana, Hydromorphone     Sexual activity: Not Currently     Partners: Male     Birth control/protection: Post-menopausal, Male Surgical, Other   Lifestyle     Physical activity:     Days per week: Not on file     Minutes per session: Not on file     Stress: Not on file   Relationships     Social connections:     Talks on phone: Not on file     Gets together: Not on file     Attends Confucianism service: Not on file     Active member of club or organization: Not on file     Attends meetings of clubs or organizations: Not on file     Relationship status: Not on file     Intimate partner violence:     Fear of current or  "ex partner: Not on file     Emotionally abused: Not on file     Physically abused: Not on file     Forced sexual activity: Not on file   Other Topics Concern     Not on file   Social History Narrative     Not on file       FAMILY HISTORY:  IBD: None  CRC: None   Family History   Problem Relation Age of Onset     Breast Cancer Paternal Grandmother      Breast Cancer Other      Diabetes Other      Obesity Other      Other Cancer Paternal Grandfather      Coronary Artery Disease Paternal Grandfather      Anxiety Disorder Mother      Depression Mother      Anxiety Disorder Maternal Half-Sister      Mental Illness Maternal Half-Sister      Depression Maternal Half-Sister      Mental Illness Father      Substance Abuse Father      Substance Abuse Brother      Substance Abuse Paternal Half-Brother      Substance Abuse Paternal Half-Sister        Past/family/social history reviewed and no changes    PHYSICAL EXAMINATION:  Constitutional: aaox3, cooperative, pleasant, not dyspneic/diaphoretic, no acute distress  Vitals reviewed: /69   Pulse 66   Ht 1.676 m (5' 6\")   Wt 59.8 kg (131 lb 14.4 oz)   SpO2 100%   BMI 21.29 kg/m    Wt:   Wt Readings from Last 2 Encounters:   12/30/19 59.8 kg (131 lb 14.4 oz)   10/23/19 61.3 kg (135 lb 1.6 oz)      Eyes: Sclera anicteric/injected  Ears/nose/mouth/throat: Normal oropharynx without ulcers or exudate, mucus membranes moist, hearing intact  Neck: supple, thyroid normal size  CV: No edema  Respiratory: Unlabored breathing  Lymph: No axillary, submandibular, supraclavicular or inguinal lymphadenopathy  Abd: Nondistended, +bs, no hepatosplenomegaly, nontender, no peritoneal signs  Skin: warm, perfused, no jaundice  Psych: Normal affect  MSK: Normal gait      PERTINENT STUDIES:  Most recent CBC:  Recent Labs   Lab Test 10/23/19  1536   WBC 3.2*   HGB 10.4*   HCT 33.3*        Most recent hepatic panel:  Recent Labs   Lab Test 10/23/19  1536   ALT 14   AST 15     Most recent " creatinine:  Recent Labs   Lab Test 10/23/19  1536   CR 0.80             Answers for HPI/ROS submitted by the patient on 10/17/2019   General Symptoms: Yes  Skin Symptoms: No  HENT Symptoms: No  EYE SYMPTOMS: No  HEART SYMPTOMS: No  LUNG SYMPTOMS: No  INTESTINAL SYMPTOMS: Yes  URINARY SYMPTOMS: No  GYNECOLOGIC SYMPTOMS: No  BREAST SYMPTOMS: No  SKELETAL SYMPTOMS: Yes  BLOOD SYMPTOMS: Yes  NERVOUS SYSTEM SYMPTOMS: No  MENTAL HEALTH SYMPTOMS: Yes  Fever: No  Loss of appetite: Yes  Weight loss: No  Weight gain: No  Fatigue: Yes  Night sweats: Yes  Chills: Yes  Increased stress: Yes  Excessive hunger: No  Excessive thirst: No  Feeling hot or cold when others believe the temperature is normal: Yes  Loss of height: No  Post-operative complications: No  Surgical site pain: No  Hallucinations: No  Change in or Loss of Energy: Yes  Hyperactivity: No  Confusion: No  Heart burn or indigestion: Yes  Nausea: Yes  Vomiting: Yes  Abdominal pain: Yes  Bloating: Yes  Constipation: Yes  Diarrhea: Yes  Blood in stool: Yes  Black stools: No  Fecal incontinence: Yes  Yellowing of skin or eyes: No  Vomit with blood: No  Change in stools: Yes  Back pain: Yes  Muscle aches: Yes  Neck pain: Yes  Swollen joints: Yes  Joint pain: No  Bone pain: Yes  Muscle cramps: Yes  Muscle weakness: No  Joint stiffness: No  Bone fracture: No  Anemia: No  Swollen glands: No  Easy bleeding or bruising: No  Edema or swelling: No  Nervous or Anxious: Yes  Depression: Yes  Trouble sleeping: Yes  Trouble thinking or concentrating: Yes  Mood changes: Yes  Panic attacks: No    Answers for HPI/ROS submitted by the patient on 12/27/2019   General Symptoms: Yes  Skin Symptoms: No  HENT Symptoms: No  EYE SYMPTOMS: No  HEART SYMPTOMS: No  LUNG SYMPTOMS: No  INTESTINAL SYMPTOMS: No  URINARY SYMPTOMS: Yes  GYNECOLOGIC SYMPTOMS: No  BREAST SYMPTOMS: No  SKELETAL SYMPTOMS: No  BLOOD SYMPTOMS: No  NERVOUS SYSTEM SYMPTOMS: No  MENTAL HEALTH SYMPTOMS: Yes  Fever: No  Loss of  appetite: No  Weight loss: No  Weight gain: No  Fatigue: Yes  Night sweats: Yes  Chills: Yes  Increased stress: Yes  Excessive hunger: No  Excessive thirst: No  Feeling hot or cold when others believe the temperature is normal: Yes  Loss of height: No  Post-operative complications: No  Surgical site pain: No  Hallucinations: No  Change in or Loss of Energy: Yes  Hyperactivity: No  Confusion: No  Trouble holding urine or incontinence: Yes  Pain or burning: No  Trouble starting or stopping: No  Increased frequency of urination: Yes  Blood in urine: Yes  Decreased frequency of urination: No  Frequent nighttime urination: No  Flank pain: No  Difficulty emptying bladder: No  Nervous or Anxious: Yes  Depression: Yes  Trouble sleeping: Yes  Trouble thinking or concentrating: Yes  Mood changes: Yes  Panic attacks: No

## 2019-12-30 NOTE — LETTER
"2019       RE: Linette Rosa  [address] [place] [address]     Dear Colleague,    Thank you for referring your patient, Linette Rosa, to the [facility] GASTROENTEROLOGY AND IBD CLINIC at [facility]. Please see a copy of my visit note below.    IBD CLINIC VISIT     CC/REFERRING MD:  self  REASON FOR CONSULTATION: UC  COLLABORATING PHYSICIAN: Tyrone Lai MD    IBD HISTORY  Age at diagnosis: 37  Extent of disease: Left sided   Current UC medications: None  Prior UC surgeries:   Canasa--> tolerated  Mesalamine (lialda 4.8g)--> nausea, vomiting and \"worsened diarrhea\"  Prior IBD Medications: None    DRUG MONITORING  TPMT enzyme activity: pending    6-TGN/6-MMPN levels: --    Biologic concentration:--    DISEASE ASSESSMENT  Labs:  No results found for: ALBUMIN  Recent Labs   Lab Test 10/23/19  1536   CRP 16.7*   SED 68*     Endoscopic assessment: 2019 with left sided UC (bx show moderately active chronic colitis, negative for dysplasia. Random bx of right colon normal)  2017 with proctitis, 1-2 cm just inside anal verge (rectal bx show chronic active inflammation).   Enterography: --  Fecal calprotectin: --   C diff: pending    Hearn score:   Stool freq: 0 (baseline stools frequency)  Rectal bleedin (None)  PGA: 0 (normal)  Endoscopy: Not done    Remission: <3   Mild disease: 3-5  Moderate disease: 6-10  Severe disease: >10    IBDQ Score Date IBDQ - Total Score  (Higher score better)   2019 59   10/17/2019 36     ASSESSMENT/PLAN    1. Presumed UC, left sided: Endoscopic evaluation first revealed evidence of proctitis, which was shortly after her diagnosis of cervical cancer but prior to any chemo or radiation.  While on Keytruda there was noted to be progression to left-sided colitis (2019), which certainly could be a natural course of progression of her UC.  Fortunately she has symptomatically done well since discontinuing Keytruda.  Patient has " continued with Uceris PO 9 mg daily.  Patient interested in seeing if she can discontinue this, which I think is reasonable.  Should she become symptomatic consideration to restart an alternative 5-ASA or escalation of therapy to biologic, likely vedolizumab versus Ustekinumab for better safety profile.  Patient would prefer infusions over injectables.  -- May discontinue Uceris at this time and closely monitor symtoms  -- Consideration for asacol 2400 mg daily if patient becomes symptomatic, if unable to tolerate will proceed with biologic, likely vedolizumab.     2. IBD healthcare maintenance based on patients current medication:    Vaccinations:  -- Influenza (every year): Last given 10/2019  -- TdaP (every 10 years): Last given 2015  -- Pneumococcal Pneumonia (once then every 5 years): Has not received     One time confirmation of immunity or serologies:  -- Hepatitis A (serologies or immunizations): not documented   -- Hepatitis B (serologies or immunizations): serologies pending  -- Varicella: --  -- MMR:--  -- HPV (all aged 18-26): --  -- Meningococcal meningitis (all patients at risk for meningitis): --   -- Due to the immunosuppression in this patient, I would not advise administration of live vaccines such as varicella/VZV, intranasal influenza, MMR, or yellow fever vaccine (if travelling).      Cancer Screening:  Colon cancer screening:  Left sided disease. Based on extent, would recommend colonoscopy every 2-3 years after 8 years of disease.  Dysplasia screening is recommended 2025.    Cervical cancer screening: Per OBGYN given hx of cervical cancer    Skin cancer screening: Annual visual exam of skin by dermatologist since patient is immunocompromised    Bone mineral density screening   -- Recommend all patients supplement with calcium and vitamin D    Depression Screening:  -- Over the last month, have you felt down, depressed, or hopeless? Yes  -- Over the last month, have you felt little interest or  pleasure doing things? Yes    Misc:  -- Avoid tobacco use  -- Avoid NSAIDs as there is potentially a 25% chance of causing an IBD flare    RTC 3 months    Thank you for this consultation.  It was a pleasure to participate in the care of this patient; please contact us with any further questions.      Valerio Poon PA-C  Division of Gastroenterology, Hepatology and Nutrition  [facility]    HPI:   Ms. Rosa is a 39 year old year old here for follow-up for   Chief Complaint   Patient presents with     RECHECK     follow up IBD   Patient describes that her whole life she has struggled with constipation, usually having 2 bowel movements per week since she can remember with excessive bloating.  Occasionally seeing blood in the stool (painless), again for as long as she can remember.     Patient had delivered her son in 2015 and shortly after finishing breast-feeding, her menstrual cycle returned, and noticed that it was heavier than usual.  She was evaluated by her OB/GYN in February 2017 and at that time performed a LEEP procedure with newfound tumor in her cervix, and diagnosed with cervical cancer.  She underwent chemo and internal and external radiation + in May 2017.  She had a PET scan in 2017 that showed a concern for colitis. She was evaluated by [facility] at that time. The colonoscopy at that time was performed showing proctitis for 1 to 2 cm just inside the anal verge, the remainder of the exam was normal with biopsies consistent with moderately active chronic colitis consistent with involvement by inflammatory bowel disease favor ulcerative proctitis, negative for dysplasia.  No treatment for this was pursued as she was focused on her cervical cancer treatment.    Additionally she was diagnosed with an endometrial mass in 2018 causing left hydronephrosis and had a urethral stent placement.      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  She again had radiation in May 2018.  In August 2018 there was evidence of metastasis again to her spine and she again underwent radiation.  She was started on Keytruda September 2018.  She had required pain medication which exacerbated constipation.    Beginning January 2019 she began having severe constipation, requiring multiple medications to get things moving to include enemas, magnesium citrate and daily MiraLAX.  Eventually her bowel movements became completely loose, frequent, occasional accidents and blood in the stool (even after stopping the laxatives).  She describes a sensation of frequent tenesmus. Due to the symptoms of presumed UC, Keytruda was stopped in Feb 2019 as it appeared her cancer was in remission and may have been aggravating her ulcerative colitis symptoms.  CT scan performed February 2019 showed some degree of colitis.  She had been seen by [facility] who started her on Canasa suppositories at bedtime and moved forward with repeat colonoscopy.  She did feel that the suppositories were somewhat helpful for the tenesmus.  Colonoscopy April 2019 revealed extension of her colitis involving the entire left colon and transverse colon.  At that time she was started on Entocort 9 mg daily.  There was plan to have her slowly taper off Entocort and transition to Lialda monotherapy at a dose of 4.8 g daily.  Unfortunately she did not tolerate Lialda due to significant nausea and exacerbation of loose stool and she discontinued it. She tried to restart it again after about 10 days, but same symptoms returned.    Unfortunately she had evidence of metastasis again to her spine and ribs in May 2019 requiring radiation in June 2019.  She then restarted Keytruda in August 2019.    Around September 2019, she began having return of frequent bloody stools with tenesmus.  She was restarted on Entocort 6 mg daily approximately 4 weeks ago with [facility].  At that time, she was having  4-5 trips to the bathroom with some passage of blood, stool and/or mucus with significant tenesmus. The rectal pressure with the tenesmus often will cause her to vomit.    She does mention some joint pains in her right elbow and right wrist and her left knee and back.  No skin or eye manifestations at this time.  She has been instructed to avoid NSAID products at this time and has not taken any for pain control.    The patient has also been working with a natural path and is currently on a low fiber, low residue diet.  She continues to require narcotic pain medication taking hydromorphone 1-2 times a day and Ativan at night. She also has Norco and Compazine prescribed but rarely takes these.    Patient has also been seen by [facility] the same day as previous consult.  Patient notes she was told that there was a concern regarding an element of overflow diarrhea.      Interval history 12/30/19  Patient did not start the Asacol but continued with budesonide (Uceris) 9 mg daily.  She has since stopped the Keytruda, however unfortunately there has been further advancement in metastasis.  No plans of continuing Keytruda, moving forward with alternative therapy.  Since discontinuing Keytruda, this has allowed for patient to move to 1-2 formed stools per day without blood in the stool.  She denies any tenesmus or urgency.  She denies any nighttime stools.    ROS:  Complete 10 System ROS performed. All are negative except as documented below, in the HPI, or in patient questionnaire from today's visit.    No fevers or chills  No weight loss  No blurry vision, double vision or change in vision  No sore throat  No lymphadenopathy  No headache, paraesthesias, or weakness in a limb  No shortness of breath or wheezing  No chest pain or pressure  No arthralgias or myalgias  No rashes or skin changes  No odynophagia or dysphagia  No BRBPR, hematochezia, melena  No dysuria, frequency or urgency  No hot/cold  intolerance or polyria  No anxiety or depression    Extra intestinal manifestations of IBD:  No uveitis/episcleritis  No aphthous ulcers   No arthritis   No erythema nodosum/pyoderma gangrenosum.     PERTINENT PAST MEDICAL HISTORY:  Past Medical History:   Diagnosis Date     Abnormal Pap smear 3/14/17    Diagnosed with Cervical Cancer on April 3, 2017     Anxiety April 2017     Cancer (H) April 2017     Chronic constipation Since I can remember     Depressive disorder April 2017     Fecal incontinence April 2017     Herpes simplex without mention of complication April 2007     History of chemotherapy October 19     History of radiation therapy June 19     History of steroid therapy September 19     Postpartum depression September 2017     PREVIOUS SURGERIES:  Past Surgical History:   Procedure Laterality Date     BIOPSY  3/30/2017    As a result of abnormal pap     COLONOSCOPY  04/10/2017    As a result of Pet SCAN post cancer diagnosis, normal result       PREVIOUS ENDOSCOPY:  See HPI    ALLERGIES:     Allergies   Allergen Reactions     Bactrim [Sulfamethoxazole W/Trimethoprim]      Carica Papaya Nausea and Vomiting     Levaquin Itching     Levofloxacin Itching     Salbador Flavor Nausea and Vomiting       PERTINENT MEDICATIONS:    Current Outpatient Medications:      Ascorbic Acid (VITAMIN C) 500 MG CAPS, Take 500 mg by mouth daily, Disp: , Rfl:      budesonide (UCERIS) 9 MG 24 hr tablet, Take 1 tablet (9 mg) by mouth every morning, Disp: 30 tablet, Rfl: 1     cholecalciferol (VITAMIN D-1000 MAX ST) 1000 UNITS TABS, Take 1,000 Units by mouth, Disp: , Rfl:      escitalopram (LEXAPRO) 10 MG tablet, 20 mg daily , Disp: , Rfl:      estradiol (VIVELLE-DOT) 0.05 MG/24HR BIW patch, Place 1 patch onto the skin twice a week, Disp: 24 patch, Rfl: 2     gabapentin (NEURONTIN) 100 MG capsule, TK 1 C PO TID, Disp: , Rfl: 3     HYDROcodone-acetaminophen (NORCO) 5-325 MG tablet, TK 1 T PO Q 4 H PRF PAIN, Disp: , Rfl: 0      hydrocortisone (CORTENEMA) 100 MG/60ML enema, Place 1 enema rectally At Bedtime, Disp: 30 enema, Rfl: 3     HYDROmorphone (DILAUDID) 2 MG tablet, TK 1 TO 2 TS PO Q 4 H PRF PAIN, Disp: , Rfl: 0     LORazepam (ATIVAN) 1 MG tablet, TAKE 1 TABLET BY MOUTH AT BEDTIME IF NEEDED FOR ANXIETY OR SLEEP., Disp: , Rfl: 0     MAGNESIUM PO, Take 200 mg by mouth, Disp: , Rfl:      MELATONIN PO, Take by mouth daily, Disp: , Rfl:      mesalamine (ASACOL HD) 800 MG EC tablet, Take 3 tablets (2,400 mg) by mouth daily, Disp: 90 tablet, Rfl: 0     naltrexone (DEPADE/REVIA) 50 MG tablet, , Disp: , Rfl:      Pembrolizumab (KEYTRUDA IV), , Disp: , Rfl:      prochlorperazine (COMPAZINE) 10 MG tablet, Take 10 mg by mouth, Disp: , Rfl:      progesterone (PROMETRIUM) 200 MG capsule, Take 1 capsule (200 mg) by mouth daily, Disp: 90 capsule, Rfl: 0     UNABLE TO FIND, MEDICATION NAME: Curcumin 2 tablets by mouth daily, Disp: , Rfl:     SOCIAL HISTORY:  Previously in Marketing  Social History     Socioeconomic History     Marital status:      Spouse name: Not on file     Number of children: Not on file     Years of education: Not on file     Highest education level: Not on file   Occupational History     Not on file   Social Needs     Financial resource strain: Not on file     Food insecurity:     Worry: Not on file     Inability: Not on file     Transportation needs:     Medical: Not on file     Non-medical: Not on file   Tobacco Use     Smoking status: Never Smoker     Smokeless tobacco: Never Used   Substance and Sexual Activity     Alcohol use: Not Currently     Comment: once a month or so     Drug use: Yes     Types: Marijuana, Hydromorphone     Sexual activity: Not Currently     Partners: Male     Birth control/protection: Post-menopausal, Male Surgical, Other   Lifestyle     Physical activity:     Days per week: Not on file     Minutes per session: Not on file     Stress: Not on file   Relationships     Social connections:     Talks  "on phone: Not on file     Gets together: Not on file     Attends [employer] service: Not on file     Active member of club or organization: Not on file     Attends meetings of clubs or organizations: Not on file     Relationship status: Not on file     Intimate partner violence:     Fear of current or ex partner: Not on file     Emotionally abused: Not on file     Physically abused: Not on file     Forced sexual activity: Not on file   Other Topics Concern     Not on file   Social History Narrative     Not on file       FAMILY HISTORY:  IBD: None  CRC: None   Family History   Problem Relation Age of Onset     Breast Cancer Paternal Grandmother      Breast Cancer Other      Diabetes Other      Obesity Other      Other Cancer Paternal Grandfather      Coronary Artery Disease Paternal Grandfather      Anxiety Disorder Mother      Depression Mother      Anxiety Disorder Maternal Half-Sister      Mental Illness Maternal Half-Sister      Depression Maternal Half-Sister      Mental Illness Father      Substance Abuse Father      Substance Abuse Brother      Substance Abuse Paternal Half-Brother      Substance Abuse Paternal Half-Sister        Past/family/social history reviewed and no changes    PHYSICAL EXAMINATION:  Constitutional: aaox3, cooperative, pleasant, not dyspneic/diaphoretic, no acute distress  Vitals reviewed: /69   Pulse 66   Ht 1.676 m (5' 6\")   Wt 59.8 kg (131 lb 14.4 oz)   SpO2 100%   BMI 21.29 kg/m     Wt:   Wt Readings from Last 2 Encounters:   12/30/19 59.8 kg (131 lb 14.4 oz)   10/23/19 61.3 kg (135 lb 1.6 oz)      Eyes: Sclera anicteric/injected  Ears/nose/mouth/throat: Normal oropharynx without ulcers or exudate, mucus membranes moist, hearing intact  Neck: supple, thyroid normal size  CV: No edema  Respiratory: Unlabored breathing  Lymph: No axillary, submandibular, supraclavicular or inguinal lymphadenopathy  Abd: Nondistended, +bs, no hepatosplenomegaly, nontender, no peritoneal " signs  Skin: warm, perfused, no jaundice  Psych: Normal affect  MSK: Normal gait      PERTINENT STUDIES:  Most recent CBC:  Recent Labs   Lab Test 10/23/19  1536   WBC 3.2*   HGB 10.4*   HCT 33.3*        Most recent hepatic panel:  Recent Labs   Lab Test 10/23/19  1536   ALT 14   AST 15     Most recent creatinine:  Recent Labs   Lab Test 10/23/19  1536   CR 0.80     Again, thank you for allowing me to participate in the care of your patient.      Sincerely,    Valerio Poon PA-C

## 2019-12-30 NOTE — NURSING NOTE
"Chief Complaint   Patient presents with     RECHECK     follow up IBD       Vitals:    12/30/19 1131   BP: 101/69   Pulse: 66   SpO2: 100%   Weight: 59.8 kg (131 lb 14.4 oz)   Height: 1.676 m (5' 6\")       Body mass index is 21.29 kg/m .    Margarita Harris CMA    "

## 2019-12-30 NOTE — PATIENT INSTRUCTIONS
It was a pleasure taking care of you today.  I've included a brief summary of our discussion and care plan from today's visit below.  Please review this information with your primary care provider.  ______________________________________________________________________    My recommendations are summarized as follows:    -- May discontinue Uceris at this time and closely monitor symtoms    For additional resources about inflammatory bowel disease visit [email or web address]    Return to GI Clinic in 3 months to review your progress.    ______________________________________________________________________    Who do I call with any questions after my visit?  Please be in touch if there are any further questions that arise following today's visit.  There are multiple ways to contact your gastroenterology care team.        During business hours, you may reach a Gastroenterology nurse at [phone].       To schedule or reschedule an appointment, please call [phone].       You can always send a secure message through Ampulse.  Ampulse messages are answered by your nurse or doctor typically within 24 hours.  Please allow extra time on weekends and holidays.        For urgent/emergent questions after business hours, you may reach the on-call GI Fellow by contacting the [facility]  at [phone].      In order for your refill to be processed in a timely fashion, it is your responsibility to ensure you follow the recommendations from your provider regarding your laboratory studies and follow up appointments.       How will I get the results of any tests ordered?    You will receive all of your results.  If you have signed up for [employer]mpulse, any tests ordered at your visit will be available to you after your physician reviews them.  Typically this takes 1-2 weeks.  If there are urgent results that require a change in your care plan, your physician or nurse will call you to discuss  the next steps.      What is [employer]?  MyColorScreen is a secure way for you to access all of your healthcare records from the [facility].  It is a web based computer program, so you can sign on to it from any location.  It also allows you to send secure messages to your care team.  I recommend signing up for MyColorScreen access if you have not already done so and are comfortable with using a computer.      How to I schedule a follow-up visit?  If you did not schedule a follow-up visit today, please call [phone] to schedule a follow-up office visit.        Sincerely,    Valerio Poon PA-C  [facility]  Division of Gastroenterology

## 2020-01-01 ENCOUNTER — HOME INFUSION (PRE-WILLOW HOME INFUSION) (OUTPATIENT)
Dept: PHARMACY | Facility: CLINIC | Age: 40
End: 2020-01-01

## 2020-01-01 ENCOUNTER — HEALTH MAINTENANCE LETTER (OUTPATIENT)
Age: 40
End: 2020-01-01

## 2020-01-01 ENCOUNTER — DOCUMENTATION ONLY (OUTPATIENT)
Facility: CLINIC | Age: 40
End: 2020-01-01

## 2020-01-01 ENCOUNTER — HOSPITAL ENCOUNTER (OUTPATIENT)
Facility: CLINIC | Age: 40
End: 2020-01-01
Payer: COMMERCIAL

## 2020-01-01 ENCOUNTER — TELEPHONE (OUTPATIENT)
Dept: MEDSURG UNIT | Facility: CLINIC | Age: 40
End: 2020-01-01

## 2020-01-01 DIAGNOSIS — Z11.59 ENCOUNTER FOR SCREENING FOR OTHER VIRAL DISEASES: Primary | ICD-10-CM

## 2020-01-01 LAB
SARS-COV-2 RNA SPEC QL NAA+PROBE: NOT DETECTED
SPECIMEN SOURCE: NORMAL

## 2020-01-01 PROCEDURE — U0003 INFECTIOUS AGENT DETECTION BY NUCLEIC ACID (DNA OR RNA); SEVERE ACUTE RESPIRATORY SYNDROME CORONAVIRUS 2 (SARS-COV-2) (CORONAVIRUS DISEASE [COVID-19]), AMPLIFIED PROBE TECHNIQUE, MAKING USE OF HIGH THROUGHPUT TECHNOLOGIES AS DESCRIBED BY CMS-2020-01-R: HCPCS | Performed by: FAMILY MEDICINE

## 2020-01-02 NOTE — PROGRESS NOTES
This is a recent snapshot of the patient's [facility] medical record.  For current drug dose and complete information and questions, call [phone] or In Basket pool, fv home infusion ([id number])  CSN Number:  [id number]

## 2020-01-03 NOTE — PROGRESS NOTES
This is a recent snapshot of the patient's [facility] medical record.  For current drug dose and complete information and questions, call [phone] or In Basket pool, fv home infusion ([id number])  CSN Number:  [id number]

## 2020-01-07 NOTE — PROGRESS NOTES
This is a recent snapshot of the patient's [facility] medical record.  For current drug dose and complete information and questions, call [phone] or In Basket pool, fv home infusion ([id number])  CSN Number:  [id number]

## 2020-01-09 NOTE — PROGRESS NOTES
This is a recent snapshot of the patient's [facility] medical record.  For current drug dose and complete information and questions, call [phone] or In [facility] pool, fv home infusion ([id number])  CSN Number:  [id number]

## 2020-01-10 NOTE — PROGRESS NOTES
This is a recent snapshot of the patient's [facility] medical record.  For current drug dose and complete information and questions, call [phone] or In Basket pool, fv home infusion ([id number])  CSN Number:  [id number]

## 2020-01-13 NOTE — NURSING NOTE
Chief Complaint   Patient presents with     Consult For     Bio identicals consult       See LORI Ken 12/5/2017  
<--- Click to Launch ICDx for PreOp, PostOp and Procedure

## 2020-01-14 NOTE — PROGRESS NOTES
This is a recent snapshot of the patient's [facility] medical record.  For current drug dose and complete information and questions, call [phone] or In Basket pool, fv home infusion ([id number])  CSN Number:  [id number]

## 2020-01-15 NOTE — PROGRESS NOTES
This is a recent snapshot of the patient's [facility] medical record.  For current drug dose and complete information and questions, call [phone] or In Basket pool, fv home infusion ([id number])  CSN Number:  [id number]

## 2020-01-20 NOTE — PROGRESS NOTES
This is a recent snapshot of the patient's [facility] medical record.  For current drug dose and complete information and questions, call [phone] or In Basket pool, fv home infusion ([id number])  CSN Number:  [id number]

## 2020-01-21 NOTE — PROGRESS NOTES
This is a recent snapshot of the patient's [facility] medical record.  For current drug dose and complete information and questions, call [phone] or In Basket pool, fv home infusion ([id number])  CSN Number:  [id number]

## 2020-02-03 NOTE — PROGRESS NOTES
This is a recent snapshot of the patient's [facility] medical record.  For current drug dose and complete information and questions, call [phone] or In Basket pool, fv home infusion ([id number])  CSN Number:  [id number]

## 2020-02-05 NOTE — PROGRESS NOTES
This is a recent snapshot of the patient's [facility] medical record.  For current drug dose and complete information and questions, call [phone] or In Basket pool, fv home infusion ([id number])  CSN Number:  [id number]

## 2020-02-12 NOTE — PROGRESS NOTES
This is a recent snapshot of the patient's [facility] medical record.  For current drug dose and complete information and questions, call [phone] or In Basket pool, fv home infusion ([id number])  CSN Number:  [id number]

## 2020-02-13 NOTE — PROGRESS NOTES
This is a recent snapshot of the patient's [facility] medical record.  For current drug dose and complete information and questions, call [phone] or In Basket pool, fv home infusion ([id number])  CSN Number:  [id number]

## 2020-02-17 NOTE — PROGRESS NOTES
This is a recent snapshot of the patient's [facility] medical record.  For current drug dose and complete information and questions, call [phone] or In Basket pool, fv home infusion ([id number])  CSN Number:  [id number]

## 2020-02-20 NOTE — PROGRESS NOTES
This is a recent snapshot of the patient's [facility] medical record.  For current drug dose and complete information and questions, call [phone] or In Basket pool, fv home infusion ([id number])  CSN Number:  [id number]

## 2020-02-24 NOTE — PROGRESS NOTES
This is a recent snapshot of the patient's [facility] medical record.  For current drug dose and complete information and questions, call [phone] or In [facility] pool, fv home infusion ([id number])  CSN Number:  [id number]

## 2020-02-27 NOTE — PROGRESS NOTES
This is a recent snapshot of the patient's [facility] medical record.  For current drug dose and complete information and questions, call [phone] or In Basket pool, fv home infusion ([id number])  CSN Number:  [id number]

## 2020-03-05 NOTE — PROGRESS NOTES
This is a recent snapshot of the patient's [facility] medical record.  For current drug dose and complete information and questions, call [phone] or In Basket pool, fv home infusion ([id number])  CSN Number:  [id number]

## 2020-03-09 NOTE — PROGRESS NOTES
This is a recent snapshot of the patient's [facility] medical record.  For current drug dose and complete information and questions, call [phone] or In Basket pool, fv home infusion ([id number])  CSN Number:  [id number]

## 2020-03-09 NOTE — PROGRESS NOTES
This is a recent snapshot of the patient's [facility] medical record.  For current drug dose and complete information and questions, call [phone] or In Basket pool, fv home infusion ([id number])  CSN Number:  [id number]

## 2020-03-10 NOTE — PROGRESS NOTES
This is a recent snapshot of the patient's [facility] medical record.  For current drug dose and complete information and questions, call [phone] or In Basket pool, fv home infusion ([id number])  CSN Number:  [id number]

## 2020-03-17 NOTE — PROGRESS NOTES
This is a recent snapshot of the patient's [facility] medical record.  For current drug dose and complete information and questions, call [phone] or In Basket pool, fv home infusion ([id number])  CSN Number:  [id number]

## 2020-03-19 NOTE — PROGRESS NOTES
This is a recent snapshot of the patient's [facility] medical record.  For current drug dose and complete information and questions, call [phone] or In Basket pool, fv home infusion ([id number])  CSN Number:  [id number]

## 2020-03-20 NOTE — PROGRESS NOTES
This is a recent snapshot of the patient's [facility] medical record.  For current drug dose and complete information and questions, call [phone] or In Basket pool, fv home infusion ([id number])  CSN Number:  [id number]

## 2020-03-26 NOTE — PROGRESS NOTES
This is a recent snapshot of the patient's [facility] medical record.  For current drug dose and complete information and questions, call [phone] or In Basket pool, fv home infusion ([id number])  CSN Number:  [id number]

## 2020-03-31 NOTE — PROGRESS NOTES
This is a recent snapshot of the patient's [facility] medical record.  For current drug dose and complete information and questions, call [phone] or In Basket pool, fv home infusion ([id number])  CSN Number:  [id number]

## 2020-04-02 NOTE — PROGRESS NOTES
This is a recent snapshot of the patient's [facility] medical record.  For current drug dose and complete information and questions, call [phone] or In Basket pool, fv home infusion ([id number])  CSN Number:  [id number]

## 2020-04-13 NOTE — PROGRESS NOTES
This is a recent snapshot of the patient's [facility] medical record.  For current drug dose and complete information and questions, call [phone] or In Basket pool, fv home infusion ([id number])  CSN Number:  [id number]

## 2020-04-16 NOTE — PROGRESS NOTES
This is a recent snapshot of the patient's [facility] medical record.  For current drug dose and complete information and questions, call [phone] or In Basket pool, fv home infusion ([id number])  CSN Number:  [id number]

## 2020-04-17 NOTE — PROGRESS NOTES
This is a recent snapshot of the patient's [facility] medical record.  For current drug dose and complete information and questions, call [phone] or In Basket pool, fv home infusion ([id number])  CSN Number:  [id number]

## 2020-04-27 NOTE — PROGRESS NOTES
This is a recent snapshot of the patient's [facility] medical record.  For current drug dose and complete information and questions, call [phone] or In Basket pool, fv home infusion ([id number])  CSN Number:  [id number]

## 2020-04-28 NOTE — PROGRESS NOTES
This is a recent snapshot of the patient's [facility] medical record.  For current drug dose and complete information and questions, call [phone] or In Basket pool, fv home infusion ([id number])  CSN Number:  [id number]

## 2020-05-04 NOTE — PROGRESS NOTES
This is a recent snapshot of the patient's [facility] medical record.  For current drug dose and complete information and questions, call [phone] or In Basket pool, fv home infusion ([id number])  CSN Number:  [id number]

## 2020-05-04 NOTE — PROGRESS NOTES
This is a recent snapshot of the patient's [facility] medical record.  For current drug dose and complete information and questions, call [phone] or In Basket pool, fv home infusion ([id number])  CSN Number:  [id number]

## 2020-05-12 NOTE — PROGRESS NOTES
This is a recent snapshot of the patient's [facility] medical record.  For current drug dose and complete information and questions, call [phone] or In Basket pool, fv home infusion ([id number])  CSN Number:  [id number]

## 2020-05-12 NOTE — PROGRESS NOTES
This is a recent snapshot of the patient's [facility] medical record.  For current drug dose and complete information and questions, call [phone] or In Basket pool, fv home infusion ([id number])  CSN Number:  [id number]

## 2020-05-12 NOTE — PROGRESS NOTES
This is a recent snapshot of the patient's [facility] medical record.  For current drug dose and complete information and questions, call [phone] or In Basket pool, fv home infusion ([id number])  CSN Number:  [id number]

## 2020-05-15 NOTE — PROGRESS NOTES
This is a recent snapshot of the patient's [facility] medical record.  For current drug dose and complete information and questions, call [phone] or In Basket pool, fv home infusion ([id number])  CSN Number:  [id number]

## 2020-05-22 NOTE — PROGRESS NOTES
This is a recent snapshot of the patient's [facility] medical record.  For current drug dose and complete information and questions, call [phone] or In Basket pool, fv home infusion ([id number])  CSN Number:  [id number]

## 2020-05-26 NOTE — PROGRESS NOTES
This is a recent snapshot of the patient's [facility] medical record.  For current drug dose and complete information and questions, call [phone] or In Basket pool, fv home infusion ([id number])  CSN Number:  [id number]

## 2020-05-26 NOTE — PROGRESS NOTES
This is a recent snapshot of the patient's [facility] medical record.  For current drug dose and complete information and questions, call [phone] or In Basket pool, fv home infusion ([id number])  CSN Number:  [id number]

## 2020-05-27 NOTE — PROGRESS NOTES
This is a recent snapshot of the patient's [facility] medical record.  For current drug dose and complete information and questions, call [phone] or In Basket pool, fv home infusion ([id number])  CSN Number:  [id number]

## 2020-06-01 NOTE — PROGRESS NOTES
This is a recent snapshot of the patient's [facility] medical record.  For current drug dose and complete information and questions, call [phone] or In Basket pool, fv home infusion ([id number])  CSN Number:  [id number]

## 2020-06-04 NOTE — PROGRESS NOTES
This is a recent snapshot of the patient's [facility] medical record.  For current drug dose and complete information and questions, call [phone] or In Basket pool, fv home infusion ([id number])  CSN Number:  [id number]

## 2020-06-09 NOTE — PROGRESS NOTES
This is a recent snapshot of the patient's [facility] medical record.  For current drug dose and complete information and questions, call [phone] or In Basket pool, fv home infusion ([id number])  CSN Number:  [id number]

## 2020-06-09 NOTE — PROGRESS NOTES
This is a recent snapshot of the patient's [facility] medical record.  For current drug dose and complete information and questions, call [phone] or In Basket pool, fv home infusion ([id number])  CSN Number:  [id number]

## 2020-06-12 NOTE — PROGRESS NOTES
This is a recent snapshot of the patient's [facility] medical record.  For current drug dose and complete information and questions, call [phone] or In Basket pool, fv home infusion ([id number])  CSN Number:  [id number]

## 2020-06-12 NOTE — PROGRESS NOTES
This is a recent snapshot of the patient's [facility] medical record.  For current drug dose and complete information and questions, call [phone] or In Basket pool, fv home infusion ([id number])  CSN Number:  [id number]

## 2020-06-18 NOTE — PROGRESS NOTES
This is a recent snapshot of the patient's [facility] medical record.  For current drug dose and complete information and questions, call [phone] or In Basket pool, fv home infusion ([id number])  CSN Number:  [id number]

## 2020-06-18 NOTE — PROGRESS NOTES
This is a recent snapshot of the patient's [facility] medical record.  For current drug dose and complete information and questions, call [phone] or In Basket pool, fv home infusion ([id number])  CSN Number:  [id number]

## 2020-06-22 NOTE — PROGRESS NOTES
This is a recent snapshot of the patient's [facility] medical record.  For current drug dose and complete information and questions, call [phone] or In Basket pool, fv home infusion ([id number])  CSN Number:  [id number]

## 2020-06-24 NOTE — PROGRESS NOTES
This is a recent snapshot of the patient's [facility] medical record.  For current drug dose and complete information and questions, call [phone] or In [facility] pool, fv home infusion ([id number])  CSN Number:  [id number]

## 2020-07-01 NOTE — PROGRESS NOTES
This is a recent snapshot of the patient's [facility] medical record.  For current drug dose and complete information and questions, call [phone] or In [facility] pool, fv home infusion ([id number])  CSN Number:  [id number]

## 2020-07-07 NOTE — PROGRESS NOTES
This is a recent snapshot of the patient's [facility] medical record.  For current drug dose and complete information and questions, call [phone] or In Basket pool, fv home infusion ([id number])  CSN Number:  [id number]

## 2020-07-08 NOTE — PROGRESS NOTES
This is a recent snapshot of the patient's [facility] medical record.  For current drug dose and complete information and questions, call [phone] or In Basket pool, fv home infusion ([id number])  CSN Number:  [id number]

## 2020-07-13 NOTE — PROGRESS NOTES
This is a recent snapshot of the patient's [facility] medical record.  For current drug dose and complete information and questions, call [phone] or In [facility] pool, fv home infusion ([id number])  CSN Number:  [id number]

## 2020-07-13 NOTE — PROGRESS NOTES
This is a recent snapshot of the patient's [facility] medical record.  For current drug dose and complete information and questions, call [phone] or In Basket pool, fv home infusion ([id number])  CSN Number:  [id number]

## 2020-07-20 NOTE — PROGRESS NOTES
This is a recent snapshot of the patient's [facility] medical record.  For current drug dose and complete information and questions, call [phone] or In Basket pool, fv home infusion ([id number])  CSN Number:  [id number]

## 2020-07-27 NOTE — PROGRESS NOTES
This is a recent snapshot of the patient's [facility] medical record.  For current drug dose and complete information and questions, call [phone] or In Basket pool, fv home infusion ([id number])  CSN Number:  [id number]

## 2020-08-14 NOTE — PROGRESS NOTES
This is a recent snapshot of the patient's [facility] medical record.  For current drug dose and complete information and questions, call [phone] or In Basket pool, fv home infusion ([id number])  CSN Number:  [id number]

## 2020-08-18 NOTE — PROGRESS NOTES
Hospice physician visit  Location: Home  I wore mask, face shield, gloves for physical exam.  Patient was seen outside given her nebulizer use.  Reason for visit: Assess symptoms  HPI: Patient is a 40-year-old woman with recurrent, progressive, cervical cancer associated with ureteral obstruction and hydronephrosis status post stent, retroperitoneal adenopathy and pelvic adenopathy.  She completed radiation therapy.  Unfortunately, she had disease progression with worsening adenopathy, bone metastasis, lung metastasis.  She asked for a visit due to worsening symptoms.  She has had progressive bilateral lower extremity edema and at times swelling in her face and hands.  She started Lasix 20 mg/day and spironolactone 12.5 mg/day several days ago with no discernible effect.  She has some areas of skin breakdown on the lower legs secondary to significant edema.  She is also felt her abdomen to be more distended.  Patient family read about paracentesis and wonder if this is an option for her.  She has had significant pain, abdominal, skeletal, legs, improved with recent increase in methadone now on 7.5 mg 3 times daily along with morphine 5 to 10 mg every 2 hours as needed taking approximately 3 doses per day.  Pain has improved in the last 24 hours, currently on day 3 of methadone increase.  Also this week developed new shortness of breath with some wheezing.  No fever or chills, no sputum production.  Using albuterol nebulizer without effect.  Hypoxic at times using oxygen for symptom management, effective.  Morphine is effective for her breathing.  Has constipation, managing with senna, effective.  Stools are soft and easy to pass.  Admits to fatigue.  No insomnia.  Appetite is okay.  PMH: As per HPI.  Surgical history: Multiple cystoscopies with stent exchange, joelle sleeve placement and removal, colposcopy and LEEP, wisdom teeth extraction.  Family history: Positive for depression, anxiety, alcoholism.  Positive for  maternal grandmother with lung cancer, maternal aunt with breast cancer, paternal grandmother with breast cancer.  Social history: Lives with  and young son.  Large extended family very supportive and loving.  Non-smoker, never smoker.  12 point review of systems otherwise negative except as per HPI.  Physical exam  Awake, alert, pleasant, cooperative, mildly short of breath at rest with talking but not wearing oxygen.  O2 sats 99% on room air, blood pressure 120s over 80s.  Eyes: Nonicteric.  Oropharynx: Moist.  Neck: Supple, no LAD, thyromegaly, mass.  Chest: Faint crackles heard anteriorly in the right, no rales, rhonchi posteriorly.  Does have some transmitted upper airway noises.  Heart: RRR, no M.  Abdomen: Soft, moderate to severe distention, bowel sounds present, small fluid wave.  Tender to palpation in the right upper quadrant.  Extremities: Severe lymphedema with superimposed 1+ pitting edema in the legs.  Skin: Small areas of abrasion on the extensor surfaces of the right foot and shin.  Psych: Thought logical and goal-directed, no evidence of cognitive dysfunction on exam.    Assessment: Patient is a 40-year-old woman with metastatic cervical cancer, and progressive symptoms of lymphedema, edema, shortness of breath, pain.  Her prognosis is poor, estimated weeks to a month due to her metastatic cancer with progressive symptoms    Plan  Worsening lymphedema: mild superimposed edema. Trial of increasing lasix to 40 mg/d, keep spironolactone at 12.5. May do a trial of lymphedema therapy  Ab distention: difficult to tell on exam if has significant ascites-- I believe there is some present, but also likely to have increased cervical cancer spread. OK to proceed with possible paracentesis. Discussed that she will need a negative covid test prior to having procedure. Also that she needs to be well enough to travel by private car to have benefit. Lastly, discussed that there may not be significant fluid  on US, or that they fluid may be loculated and not amendable  Ab pain and shortness of breath: just finishing day #2 of methadone increase. Likely to feel better next 24 hours, continue prn morphine and dexamethasone. Discussed increasing dexamethasone to 4 mg bid to see if this improves symptoms.   Skin breakdown on legs due to edema: vaseline gauze plus kerlix to absorb drainage.  Lots of time spent answering questions from patient's extended family about her symptoms and options for management.     Jany Moreno MD  Associate Medical Director  [facility]

## 2020-08-20 NOTE — TELEPHONE ENCOUNTER
Pre-Procedure Negative COVID Test     Step 1 Patient Notification  Patient notified of the negative COVID test result (left message, unable to reach patient after three voicemails).    Step 2 Patient Information   Patient informed to contact the ordering provider if any of the symptoms develop prior to the procedure    Fever/Chills   Cough   Shortness of breath   New loss of taste or smell  Sore throat  Muscle or body aches  Headaches  Fatigue  Vomiting or diarrhea    Step 3 Review Visitor Policy  Patient informed of the updated visitor policy     1 visitor allowed per patient   Visitor must screen negative for COVID symptoms   Visitor must wear a mask  Waiting rooms continue to be closed to visitors

## 2020-09-29 ENCOUNTER — HOME INFUSION (PRE-WILLOW HOME INFUSION) (OUTPATIENT)
Dept: PHARMACY | Facility: CLINIC | Age: 40
End: 2020-09-29

## 2020-10-01 NOTE — PROGRESS NOTES
This is a recent snapshot of the patient's [facility] medical record.  For current drug dose and complete information and questions, call [phone] or In Basket pool, fv home infusion ([id number])  CSN Number:  [id number]

## 2024-10-23 ASSESSMENT — PATIENT HEALTH QUESTIONNAIRE - PHQ9: SUM OF ALL RESPONSES TO PHQ QUESTIONS 1-9: 12
